# Patient Record
Sex: MALE | Race: WHITE | NOT HISPANIC OR LATINO | URBAN - METROPOLITAN AREA
[De-identification: names, ages, dates, MRNs, and addresses within clinical notes are randomized per-mention and may not be internally consistent; named-entity substitution may affect disease eponyms.]

---

## 2017-03-31 ENCOUNTER — INPATIENT (INPATIENT)
Facility: HOSPITAL | Age: 77
LOS: 3 days | Discharge: ANOTHER HOSPITAL | End: 2017-04-04
Attending: INTERNAL MEDICINE

## 2017-06-27 DIAGNOSIS — I48.92 UNSPECIFIED ATRIAL FLUTTER: ICD-10-CM

## 2017-06-27 DIAGNOSIS — K81.0 ACUTE CHOLECYSTITIS: ICD-10-CM

## 2017-06-27 DIAGNOSIS — I48.0 PAROXYSMAL ATRIAL FIBRILLATION: ICD-10-CM

## 2017-06-27 DIAGNOSIS — A41.9 SEPSIS, UNSPECIFIED ORGANISM: ICD-10-CM

## 2017-06-27 DIAGNOSIS — I25.9 CHRONIC ISCHEMIC HEART DISEASE, UNSPECIFIED: ICD-10-CM

## 2017-06-27 DIAGNOSIS — Z87.891 PERSONAL HISTORY OF NICOTINE DEPENDENCE: ICD-10-CM

## 2017-06-27 DIAGNOSIS — Z79.01 LONG TERM (CURRENT) USE OF ANTICOAGULANTS: ICD-10-CM

## 2017-06-27 DIAGNOSIS — I25.10 ATHEROSCLEROTIC HEART DISEASE OF NATIVE CORONARY ARTERY WITHOUT ANGINA PECTORIS: ICD-10-CM

## 2017-06-27 DIAGNOSIS — E78.5 HYPERLIPIDEMIA, UNSPECIFIED: ICD-10-CM

## 2017-06-27 DIAGNOSIS — I25.2 OLD MYOCARDIAL INFARCTION: ICD-10-CM

## 2022-01-01 ENCOUNTER — INPATIENT (INPATIENT)
Facility: HOSPITAL | Age: 82
LOS: 2 days | End: 2022-12-28
Attending: INTERNAL MEDICINE | Admitting: INTERNAL MEDICINE
Payer: MEDICARE

## 2022-01-01 VITALS — OXYGEN SATURATION: 100 % | RESPIRATION RATE: 18 BRPM

## 2022-01-01 VITALS
HEART RATE: 121 BPM | DIASTOLIC BLOOD PRESSURE: 67 MMHG | WEIGHT: 285.06 LBS | SYSTOLIC BLOOD PRESSURE: 108 MMHG | TEMPERATURE: 98 F | RESPIRATION RATE: 20 BRPM | OXYGEN SATURATION: 97 %

## 2022-01-01 DIAGNOSIS — I95.9 HYPOTENSION, UNSPECIFIED: ICD-10-CM

## 2022-01-01 DIAGNOSIS — Z79.01 LONG TERM (CURRENT) USE OF ANTICOAGULANTS: ICD-10-CM

## 2022-01-01 DIAGNOSIS — I48.92 UNSPECIFIED ATRIAL FLUTTER: ICD-10-CM

## 2022-01-01 DIAGNOSIS — M10.9 GOUT, UNSPECIFIED: ICD-10-CM

## 2022-01-01 DIAGNOSIS — I25.2 OLD MYOCARDIAL INFARCTION: ICD-10-CM

## 2022-01-01 DIAGNOSIS — I13.0 HYPERTENSIVE HEART AND CHRONIC KIDNEY DISEASE WITH HEART FAILURE AND STAGE 1 THROUGH STAGE 4 CHRONIC KIDNEY DISEASE, OR UNSPECIFIED CHRONIC KIDNEY DISEASE: ICD-10-CM

## 2022-01-01 DIAGNOSIS — J96.01 ACUTE RESPIRATORY FAILURE WITH HYPOXIA: ICD-10-CM

## 2022-01-01 DIAGNOSIS — I42.8 OTHER CARDIOMYOPATHIES: ICD-10-CM

## 2022-01-01 DIAGNOSIS — I47.20 VENTRICULAR TACHYCARDIA, UNSPECIFIED: ICD-10-CM

## 2022-01-01 DIAGNOSIS — A41.9 SEPSIS, UNSPECIFIED ORGANISM: ICD-10-CM

## 2022-01-01 DIAGNOSIS — J81.0 ACUTE PULMONARY EDEMA: ICD-10-CM

## 2022-01-01 DIAGNOSIS — I47.1 SUPRAVENTRICULAR TACHYCARDIA: ICD-10-CM

## 2022-01-01 DIAGNOSIS — I34.0 NONRHEUMATIC MITRAL (VALVE) INSUFFICIENCY: ICD-10-CM

## 2022-01-01 DIAGNOSIS — I50.23 ACUTE ON CHRONIC SYSTOLIC (CONGESTIVE) HEART FAILURE: ICD-10-CM

## 2022-01-01 DIAGNOSIS — I25.5 ISCHEMIC CARDIOMYOPATHY: ICD-10-CM

## 2022-01-01 DIAGNOSIS — E78.5 HYPERLIPIDEMIA, UNSPECIFIED: ICD-10-CM

## 2022-01-01 DIAGNOSIS — I48.19 OTHER PERSISTENT ATRIAL FIBRILLATION: ICD-10-CM

## 2022-01-01 DIAGNOSIS — E87.5 HYPERKALEMIA: ICD-10-CM

## 2022-01-01 DIAGNOSIS — Z95.810 PRESENCE OF AUTOMATIC (IMPLANTABLE) CARDIAC DEFIBRILLATOR: ICD-10-CM

## 2022-01-01 DIAGNOSIS — I25.10 ATHEROSCLEROTIC HEART DISEASE OF NATIVE CORONARY ARTERY WITHOUT ANGINA PECTORIS: ICD-10-CM

## 2022-01-01 DIAGNOSIS — N18.30 CHRONIC KIDNEY DISEASE, STAGE 3 UNSPECIFIED: ICD-10-CM

## 2022-01-01 DIAGNOSIS — N17.9 ACUTE KIDNEY FAILURE, UNSPECIFIED: ICD-10-CM

## 2022-01-01 DIAGNOSIS — I49.3 VENTRICULAR PREMATURE DEPOLARIZATION: ICD-10-CM

## 2022-01-01 DIAGNOSIS — R57.0 CARDIOGENIC SHOCK: ICD-10-CM

## 2022-01-01 DIAGNOSIS — R45.1 RESTLESSNESS AND AGITATION: ICD-10-CM

## 2022-01-01 DIAGNOSIS — I48.0 PAROXYSMAL ATRIAL FIBRILLATION: ICD-10-CM

## 2022-01-01 DIAGNOSIS — E87.29 OTHER ACIDOSIS: ICD-10-CM

## 2022-01-01 DIAGNOSIS — I21.4 NON-ST ELEVATION (NSTEMI) MYOCARDIAL INFARCTION: ICD-10-CM

## 2022-01-01 DIAGNOSIS — I35.0 NONRHEUMATIC AORTIC (VALVE) STENOSIS: ICD-10-CM

## 2022-01-01 DIAGNOSIS — I45.81 LONG QT SYNDROME: ICD-10-CM

## 2022-01-01 LAB
ALBUMIN SERPL ELPH-MCNC: 2.9 G/DL — LOW (ref 3.5–5.2)
ALBUMIN SERPL ELPH-MCNC: 3.1 G/DL — LOW (ref 3.5–5.2)
ALBUMIN SERPL ELPH-MCNC: 3.3 G/DL — LOW (ref 3.5–5.2)
ALBUMIN SERPL ELPH-MCNC: 3.3 G/DL — LOW (ref 3.5–5.2)
ALBUMIN SERPL ELPH-MCNC: 3.4 G/DL — LOW (ref 3.5–5.2)
ALBUMIN SERPL ELPH-MCNC: 3.7 G/DL — SIGNIFICANT CHANGE UP (ref 3.5–5.2)
ALBUMIN SERPL ELPH-MCNC: 4 G/DL — SIGNIFICANT CHANGE UP (ref 3.5–5.2)
ALP SERPL-CCNC: 30 U/L — SIGNIFICANT CHANGE UP (ref 30–115)
ALP SERPL-CCNC: 33 U/L — SIGNIFICANT CHANGE UP (ref 30–115)
ALP SERPL-CCNC: 34 U/L — SIGNIFICANT CHANGE UP (ref 30–115)
ALP SERPL-CCNC: 37 U/L — SIGNIFICANT CHANGE UP (ref 30–115)
ALP SERPL-CCNC: 39 U/L — SIGNIFICANT CHANGE UP (ref 30–115)
ALP SERPL-CCNC: 41 U/L — SIGNIFICANT CHANGE UP (ref 30–115)
ALP SERPL-CCNC: 47 U/L — SIGNIFICANT CHANGE UP (ref 30–115)
ALT FLD-CCNC: 1108 U/L — HIGH (ref 0–41)
ALT FLD-CCNC: 16 U/L — SIGNIFICANT CHANGE UP (ref 0–41)
ALT FLD-CCNC: 17 U/L — SIGNIFICANT CHANGE UP (ref 0–41)
ALT FLD-CCNC: 22 U/L — SIGNIFICANT CHANGE UP (ref 0–41)
ALT FLD-CCNC: 2384 U/L — HIGH (ref 0–41)
ANION GAP SERPL CALC-SCNC: 11 MMOL/L — SIGNIFICANT CHANGE UP (ref 7–14)
ANION GAP SERPL CALC-SCNC: 12 MMOL/L — SIGNIFICANT CHANGE UP (ref 7–14)
ANION GAP SERPL CALC-SCNC: 12 MMOL/L — SIGNIFICANT CHANGE UP (ref 7–14)
ANION GAP SERPL CALC-SCNC: 16 MMOL/L — HIGH (ref 7–14)
ANION GAP SERPL CALC-SCNC: 19 MMOL/L — HIGH (ref 7–14)
ANION GAP SERPL CALC-SCNC: 21 MMOL/L — HIGH (ref 7–14)
ANION GAP SERPL CALC-SCNC: 23 MMOL/L — HIGH (ref 7–14)
ANISOCYTOSIS BLD QL: SLIGHT — SIGNIFICANT CHANGE UP
APPEARANCE UR: CLEAR — SIGNIFICANT CHANGE UP
APTT BLD: 30 SEC — SIGNIFICANT CHANGE UP (ref 27–39.2)
APTT BLD: 31.9 SEC — SIGNIFICANT CHANGE UP (ref 27–39.2)
APTT BLD: 55.8 SEC — HIGH (ref 27–39.2)
APTT BLD: 62.1 SEC — HIGH (ref 27–39.2)
APTT BLD: 65.3 SEC — HIGH (ref 27–39.2)
APTT BLD: 66.3 SEC — HIGH (ref 27–39.2)
APTT BLD: >200 SEC — CRITICAL HIGH (ref 27–39.2)
AST SERPL-CCNC: 1327 U/L — HIGH (ref 0–41)
AST SERPL-CCNC: 29 U/L — SIGNIFICANT CHANGE UP (ref 0–41)
AST SERPL-CCNC: 36 U/L — SIGNIFICANT CHANGE UP (ref 0–41)
AST SERPL-CCNC: 3756 U/L — HIGH (ref 0–41)
AST SERPL-CCNC: 46 U/L — HIGH (ref 0–41)
AST SERPL-CCNC: 47 U/L — HIGH (ref 0–41)
AST SERPL-CCNC: 59 U/L — HIGH (ref 0–41)
BASE EXCESS BLDMV CALC-SCNC: -1.8 MMOL/L — SIGNIFICANT CHANGE UP
BASE EXCESS BLDMV CALC-SCNC: -11.3 MMOL/L — SIGNIFICANT CHANGE UP
BASE EXCESS BLDMV CALC-SCNC: -5.8 MMOL/L — SIGNIFICANT CHANGE UP
BASE EXCESS BLDMV CALC-SCNC: -8.9 MMOL/L — SIGNIFICANT CHANGE UP
BASE EXCESS BLDMV CALC-SCNC: 0.8 MMOL/L — SIGNIFICANT CHANGE UP
BASE EXCESS BLDMV CALC-SCNC: 2.5 MMOL/L — SIGNIFICANT CHANGE UP
BASE EXCESS BLDMV CALC-SCNC: 2.8 MMOL/L — SIGNIFICANT CHANGE UP
BASE EXCESS BLDMV CALC-SCNC: 3.7 MMOL/L — SIGNIFICANT CHANGE UP
BASE EXCESS BLDV CALC-SCNC: -0.8 MMOL/L — SIGNIFICANT CHANGE UP (ref -2–3)
BASOPHILS # BLD AUTO: 0 K/UL — SIGNIFICANT CHANGE UP (ref 0–0.2)
BASOPHILS # BLD AUTO: 0.02 K/UL — SIGNIFICANT CHANGE UP (ref 0–0.2)
BASOPHILS # BLD AUTO: 0.03 K/UL — SIGNIFICANT CHANGE UP (ref 0–0.2)
BASOPHILS # BLD AUTO: 0.04 K/UL — SIGNIFICANT CHANGE UP (ref 0–0.2)
BASOPHILS NFR BLD AUTO: 0 % — SIGNIFICANT CHANGE UP (ref 0–1)
BASOPHILS NFR BLD AUTO: 0.2 % — SIGNIFICANT CHANGE UP (ref 0–1)
BILIRUB SERPL-MCNC: 0.4 MG/DL — SIGNIFICANT CHANGE UP (ref 0.2–1.2)
BILIRUB SERPL-MCNC: 0.5 MG/DL — SIGNIFICANT CHANGE UP (ref 0.2–1.2)
BILIRUB SERPL-MCNC: 0.6 MG/DL — SIGNIFICANT CHANGE UP (ref 0.2–1.2)
BILIRUB SERPL-MCNC: 0.6 MG/DL — SIGNIFICANT CHANGE UP (ref 0.2–1.2)
BILIRUB SERPL-MCNC: 1 MG/DL — SIGNIFICANT CHANGE UP (ref 0.2–1.2)
BILIRUB UR-MCNC: NEGATIVE — SIGNIFICANT CHANGE UP
BLD GP AB SCN SERPL QL: SIGNIFICANT CHANGE UP
BUN SERPL-MCNC: 49 MG/DL — HIGH (ref 10–20)
BUN SERPL-MCNC: 55 MG/DL — HIGH (ref 10–20)
BUN SERPL-MCNC: 57 MG/DL — HIGH (ref 10–20)
BUN SERPL-MCNC: 58 MG/DL — HIGH (ref 10–20)
BUN SERPL-MCNC: 60 MG/DL — HIGH (ref 10–20)
BUN SERPL-MCNC: 64 MG/DL — CRITICAL HIGH (ref 10–20)
BUN SERPL-MCNC: 65 MG/DL — CRITICAL HIGH (ref 10–20)
CA-I SERPL-SCNC: 1.09 MMOL/L — LOW (ref 1.15–1.33)
CALCIUM SERPL-MCNC: 7 MG/DL — LOW (ref 8.4–10.5)
CALCIUM SERPL-MCNC: 7.4 MG/DL — LOW (ref 8.4–10.5)
CALCIUM SERPL-MCNC: 8 MG/DL — LOW (ref 8.4–10.4)
CALCIUM SERPL-MCNC: 8.2 MG/DL — LOW (ref 8.4–10.5)
CALCIUM SERPL-MCNC: 8.6 MG/DL — SIGNIFICANT CHANGE UP (ref 8.4–10.5)
CALCIUM SERPL-MCNC: 8.8 MG/DL — SIGNIFICANT CHANGE UP (ref 8.4–10.4)
CALCIUM SERPL-MCNC: 9 MG/DL — SIGNIFICANT CHANGE UP (ref 8.4–10.4)
CHLORIDE SERPL-SCNC: 100 MMOL/L — SIGNIFICANT CHANGE UP (ref 98–110)
CHLORIDE SERPL-SCNC: 92 MMOL/L — LOW (ref 98–110)
CHLORIDE SERPL-SCNC: 93 MMOL/L — LOW (ref 98–110)
CHLORIDE SERPL-SCNC: 94 MMOL/L — LOW (ref 98–110)
CHLORIDE SERPL-SCNC: 95 MMOL/L — LOW (ref 98–110)
CHLORIDE SERPL-SCNC: 96 MMOL/L — LOW (ref 98–110)
CHLORIDE SERPL-SCNC: 98 MMOL/L — SIGNIFICANT CHANGE UP (ref 98–110)
CO2 SERPL-SCNC: 17 MMOL/L — SIGNIFICANT CHANGE UP (ref 17–32)
CO2 SERPL-SCNC: 18 MMOL/L — SIGNIFICANT CHANGE UP (ref 17–32)
CO2 SERPL-SCNC: 22 MMOL/L — SIGNIFICANT CHANGE UP (ref 17–32)
CO2 SERPL-SCNC: 23 MMOL/L — SIGNIFICANT CHANGE UP (ref 17–32)
CO2 SERPL-SCNC: 24 MMOL/L — SIGNIFICANT CHANGE UP (ref 17–32)
CO2 SERPL-SCNC: 24 MMOL/L — SIGNIFICANT CHANGE UP (ref 17–32)
CO2 SERPL-SCNC: 25 MMOL/L — SIGNIFICANT CHANGE UP (ref 17–32)
COLOR SPEC: SIGNIFICANT CHANGE UP
CREAT SERPL-MCNC: 2.2 MG/DL — HIGH (ref 0.7–1.5)
CREAT SERPL-MCNC: 2.3 MG/DL — HIGH (ref 0.7–1.5)
CREAT SERPL-MCNC: 2.5 MG/DL — HIGH (ref 0.7–1.5)
CREAT SERPL-MCNC: 2.5 MG/DL — HIGH (ref 0.7–1.5)
CREAT SERPL-MCNC: 2.6 MG/DL — HIGH (ref 0.7–1.5)
CREAT SERPL-MCNC: 2.7 MG/DL — HIGH (ref 0.7–1.5)
CREAT SERPL-MCNC: 3.3 MG/DL — HIGH (ref 0.7–1.5)
CULTURE RESULTS: NO GROWTH — SIGNIFICANT CHANGE UP
DIFF PNL FLD: NEGATIVE — SIGNIFICANT CHANGE UP
EGFR: 18 ML/MIN/1.73M2 — LOW
EGFR: 23 ML/MIN/1.73M2 — LOW
EGFR: 24 ML/MIN/1.73M2 — LOW
EGFR: 25 ML/MIN/1.73M2 — LOW
EGFR: 25 ML/MIN/1.73M2 — LOW
EGFR: 28 ML/MIN/1.73M2 — LOW
EGFR: 29 ML/MIN/1.73M2 — LOW
EOSINOPHIL # BLD AUTO: 0 K/UL — SIGNIFICANT CHANGE UP (ref 0–0.7)
EOSINOPHIL # BLD AUTO: 0.01 K/UL — SIGNIFICANT CHANGE UP (ref 0–0.7)
EOSINOPHIL # BLD AUTO: 0.01 K/UL — SIGNIFICANT CHANGE UP (ref 0–0.7)
EOSINOPHIL # BLD AUTO: 0.04 K/UL — SIGNIFICANT CHANGE UP (ref 0–0.7)
EOSINOPHIL NFR BLD AUTO: 0 % — SIGNIFICANT CHANGE UP (ref 0–8)
EOSINOPHIL NFR BLD AUTO: 0.1 % — SIGNIFICANT CHANGE UP (ref 0–8)
EOSINOPHIL NFR BLD AUTO: 0.1 % — SIGNIFICANT CHANGE UP (ref 0–8)
EOSINOPHIL NFR BLD AUTO: 0.2 % — SIGNIFICANT CHANGE UP (ref 0–8)
FERRITIN SERPL-MCNC: 756 NG/ML — HIGH (ref 30–400)
GAS PNL BLDA: SIGNIFICANT CHANGE UP
GAS PNL BLDMV: SIGNIFICANT CHANGE UP
GAS PNL BLDV: 131 MMOL/L — LOW (ref 136–145)
GAS PNL BLDV: SIGNIFICANT CHANGE UP
GAS PNL BLDV: SIGNIFICANT CHANGE UP
GIANT PLATELETS BLD QL SMEAR: PRESENT — SIGNIFICANT CHANGE UP
GLUCOSE BLDC GLUCOMTR-MCNC: 173 MG/DL — HIGH (ref 70–99)
GLUCOSE BLDC GLUCOMTR-MCNC: 207 MG/DL — HIGH (ref 70–99)
GLUCOSE BLDC GLUCOMTR-MCNC: 225 MG/DL — HIGH (ref 70–99)
GLUCOSE BLDC GLUCOMTR-MCNC: 244 MG/DL — HIGH (ref 70–99)
GLUCOSE BLDC GLUCOMTR-MCNC: 293 MG/DL — HIGH (ref 70–99)
GLUCOSE SERPL-MCNC: 149 MG/DL — HIGH (ref 70–99)
GLUCOSE SERPL-MCNC: 150 MG/DL — HIGH (ref 70–99)
GLUCOSE SERPL-MCNC: 175 MG/DL — HIGH (ref 70–99)
GLUCOSE SERPL-MCNC: 222 MG/DL — HIGH (ref 70–99)
GLUCOSE SERPL-MCNC: 222 MG/DL — HIGH (ref 70–99)
GLUCOSE SERPL-MCNC: 253 MG/DL — HIGH (ref 70–99)
GLUCOSE SERPL-MCNC: 269 MG/DL — HIGH (ref 70–99)
GLUCOSE UR QL: NEGATIVE — SIGNIFICANT CHANGE UP
HCO3 BLDMV-SCNC: 19 MMOL/L — SIGNIFICANT CHANGE UP
HCO3 BLDMV-SCNC: 20 MMOL/L — SIGNIFICANT CHANGE UP
HCO3 BLDMV-SCNC: 24 MMOL/L — SIGNIFICANT CHANGE UP
HCO3 BLDMV-SCNC: 26 MMOL/L — SIGNIFICANT CHANGE UP
HCO3 BLDMV-SCNC: 26 MMOL/L — SIGNIFICANT CHANGE UP
HCO3 BLDMV-SCNC: 28 MMOL/L — SIGNIFICANT CHANGE UP
HCO3 BLDV-SCNC: 24 MMOL/L — SIGNIFICANT CHANGE UP (ref 22–29)
HCT VFR BLD CALC: 27.6 % — LOW (ref 42–52)
HCT VFR BLD CALC: 28.3 % — LOW (ref 42–52)
HCT VFR BLD CALC: 29.1 % — LOW (ref 42–52)
HCT VFR BLD CALC: 29.3 % — LOW (ref 42–52)
HCT VFR BLD CALC: 30.2 % — LOW (ref 42–52)
HCT VFR BLD CALC: 31.7 % — LOW (ref 42–52)
HCT VFR BLD CALC: 33 % — LOW (ref 42–52)
HCT VFR BLDA CALC: 35 % — LOW (ref 39–51)
HGB BLD CALC-MCNC: 11.5 G/DL — LOW (ref 12.6–17.4)
HGB BLD-MCNC: 8.1 G/DL — LOW (ref 14–18)
HGB BLD-MCNC: 8.6 G/DL — LOW (ref 14–18)
HGB BLD-MCNC: 8.8 G/DL — LOW (ref 14–18)
HGB BLD-MCNC: 8.9 G/DL — LOW (ref 14–18)
HGB BLD-MCNC: 8.9 G/DL — LOW (ref 14–18)
HGB BLD-MCNC: 9 G/DL — LOW (ref 14–18)
HGB BLD-MCNC: 9.6 G/DL — LOW (ref 14–18)
HOROWITZ INDEX BLDMV+IHG-RTO: 100 — SIGNIFICANT CHANGE UP
HOROWITZ INDEX BLDMV+IHG-RTO: 28 — SIGNIFICANT CHANGE UP
HOROWITZ INDEX BLDMV+IHG-RTO: 36 — SIGNIFICANT CHANGE UP
IMM GRANULOCYTES NFR BLD AUTO: 1.5 % — HIGH (ref 0.1–0.3)
IMM GRANULOCYTES NFR BLD AUTO: 1.8 % — HIGH (ref 0.1–0.3)
IMM GRANULOCYTES NFR BLD AUTO: 3.5 % — HIGH (ref 0.1–0.3)
INR BLD: 1.54 RATIO — HIGH (ref 0.65–1.3)
INR BLD: 1.99 RATIO — HIGH (ref 0.65–1.3)
IRON SATN MFR SERPL: 18 UG/DL — LOW (ref 35–150)
IRON SATN MFR SERPL: 8 % — LOW (ref 15–50)
KETONES UR-MCNC: NEGATIVE — SIGNIFICANT CHANGE UP
LACTATE BLDV-MCNC: 3.7 MMOL/L — HIGH (ref 0.5–2)
LACTATE SERPL-SCNC: 1.2 MMOL/L — SIGNIFICANT CHANGE UP (ref 0.7–2)
LACTATE SERPL-SCNC: 7.1 MMOL/L — CRITICAL HIGH (ref 0.7–2)
LEUKOCYTE ESTERASE UR-ACNC: NEGATIVE — SIGNIFICANT CHANGE UP
LYMPHOCYTES # BLD AUTO: 0.8 K/UL — LOW (ref 1.2–3.4)
LYMPHOCYTES # BLD AUTO: 0.89 K/UL — LOW (ref 1.2–3.4)
LYMPHOCYTES # BLD AUTO: 0.98 K/UL — LOW (ref 1.2–3.4)
LYMPHOCYTES # BLD AUTO: 1.06 K/UL — LOW (ref 1.2–3.4)
LYMPHOCYTES # BLD AUTO: 4.4 % — LOW (ref 20.5–51.1)
LYMPHOCYTES # BLD AUTO: 5.8 % — LOW (ref 20.5–51.1)
LYMPHOCYTES # BLD AUTO: 5.9 % — LOW (ref 20.5–51.1)
LYMPHOCYTES # BLD AUTO: 7 % — LOW (ref 20.5–51.1)
MAGNESIUM SERPL-MCNC: 2.2 MG/DL — SIGNIFICANT CHANGE UP (ref 1.8–2.4)
MAGNESIUM SERPL-MCNC: 2.4 MG/DL — SIGNIFICANT CHANGE UP (ref 1.8–2.4)
MAGNESIUM SERPL-MCNC: 2.4 MG/DL — SIGNIFICANT CHANGE UP (ref 1.8–2.4)
MAGNESIUM SERPL-MCNC: 2.5 MG/DL — HIGH (ref 1.8–2.4)
MAGNESIUM SERPL-MCNC: 2.5 MG/DL — HIGH (ref 1.8–2.4)
MAGNESIUM SERPL-MCNC: 2.6 MG/DL — HIGH (ref 1.8–2.4)
MANUAL SMEAR VERIFICATION: SIGNIFICANT CHANGE UP
MCHC RBC-ENTMCNC: 26.3 PG — LOW (ref 27–31)
MCHC RBC-ENTMCNC: 26.3 PG — LOW (ref 27–31)
MCHC RBC-ENTMCNC: 26.5 PG — LOW (ref 27–31)
MCHC RBC-ENTMCNC: 26.5 PG — LOW (ref 27–31)
MCHC RBC-ENTMCNC: 26.6 PG — LOW (ref 27–31)
MCHC RBC-ENTMCNC: 26.7 PG — LOW (ref 27–31)
MCHC RBC-ENTMCNC: 27 PG — SIGNIFICANT CHANGE UP (ref 27–31)
MCHC RBC-ENTMCNC: 28.4 G/DL — LOW (ref 32–37)
MCHC RBC-ENTMCNC: 29.1 G/DL — LOW (ref 32–37)
MCHC RBC-ENTMCNC: 29.1 G/DL — LOW (ref 32–37)
MCHC RBC-ENTMCNC: 29.3 G/DL — LOW (ref 32–37)
MCHC RBC-ENTMCNC: 30.4 G/DL — LOW (ref 32–37)
MCHC RBC-ENTMCNC: 30.4 G/DL — LOW (ref 32–37)
MCHC RBC-ENTMCNC: 30.6 G/DL — LOW (ref 32–37)
MCV RBC AUTO: 87.4 FL — SIGNIFICANT CHANGE UP (ref 80–94)
MCV RBC AUTO: 87.7 FL — SIGNIFICANT CHANGE UP (ref 80–94)
MCV RBC AUTO: 88.7 FL — SIGNIFICANT CHANGE UP (ref 80–94)
MCV RBC AUTO: 89.6 FL — SIGNIFICANT CHANGE UP (ref 80–94)
MCV RBC AUTO: 90.4 FL — SIGNIFICANT CHANGE UP (ref 80–94)
MCV RBC AUTO: 91 FL — SIGNIFICANT CHANGE UP (ref 80–94)
MCV RBC AUTO: 93.2 FL — SIGNIFICANT CHANGE UP (ref 80–94)
MONOCYTES # BLD AUTO: 0.58 K/UL — SIGNIFICANT CHANGE UP (ref 0.1–0.6)
MONOCYTES # BLD AUTO: 0.8 K/UL — HIGH (ref 0.1–0.6)
MONOCYTES # BLD AUTO: 1 K/UL — HIGH (ref 0.1–0.6)
MONOCYTES # BLD AUTO: 1 K/UL — HIGH (ref 0.1–0.6)
MONOCYTES NFR BLD AUTO: 2.6 % — SIGNIFICANT CHANGE UP (ref 1.7–9.3)
MONOCYTES NFR BLD AUTO: 5.3 % — SIGNIFICANT CHANGE UP (ref 1.7–9.3)
MONOCYTES NFR BLD AUTO: 5.5 % — SIGNIFICANT CHANGE UP (ref 1.7–9.3)
MONOCYTES NFR BLD AUTO: 8.8 % — SIGNIFICANT CHANGE UP (ref 1.7–9.3)
MRSA PCR RESULT.: NEGATIVE — SIGNIFICANT CHANGE UP
NEUTROPHILS # BLD AUTO: 12.72 K/UL — HIGH (ref 1.4–6.5)
NEUTROPHILS # BLD AUTO: 15.82 K/UL — HIGH (ref 1.4–6.5)
NEUTROPHILS # BLD AUTO: 20.76 K/UL — HIGH (ref 1.4–6.5)
NEUTROPHILS # BLD AUTO: 9.32 K/UL — HIGH (ref 1.4–6.5)
NEUTROPHILS NFR BLD AUTO: 82.1 % — HIGH (ref 42.2–75.2)
NEUTROPHILS NFR BLD AUTO: 85 % — HIGH (ref 42.2–75.2)
NEUTROPHILS NFR BLD AUTO: 86.8 % — HIGH (ref 42.2–75.2)
NEUTROPHILS NFR BLD AUTO: 93 % — HIGH (ref 42.2–75.2)
NITRITE UR-MCNC: NEGATIVE — SIGNIFICANT CHANGE UP
NRBC # BLD: 0 /100 WBCS — SIGNIFICANT CHANGE UP (ref 0–0)
NRBC # BLD: 2 /100 WBCS — HIGH (ref 0–0)
NT-PROBNP SERPL-SCNC: HIGH PG/ML (ref 0–300)
O2 CT VFR BLD CALC: 29 MMHG — SIGNIFICANT CHANGE UP
O2 CT VFR BLD CALC: 30 MMHG — SIGNIFICANT CHANGE UP
O2 CT VFR BLD CALC: 33 MMHG — SIGNIFICANT CHANGE UP
O2 CT VFR BLD CALC: 35 MMHG — SIGNIFICANT CHANGE UP
O2 CT VFR BLD CALC: 37 MMHG — SIGNIFICANT CHANGE UP
O2 CT VFR BLD CALC: 38 MMHG — SIGNIFICANT CHANGE UP
O2 CT VFR BLD CALC: 38 MMHG — SIGNIFICANT CHANGE UP
O2 CT VFR BLD CALC: 53 MMHG — SIGNIFICANT CHANGE UP
OVALOCYTES BLD QL SMEAR: SLIGHT — SIGNIFICANT CHANGE UP
PCO2 BLDMV: 43 MMHG — SIGNIFICANT CHANGE UP
PCO2 BLDMV: 43 MMHG — SIGNIFICANT CHANGE UP
PCO2 BLDMV: 45 MMHG — SIGNIFICANT CHANGE UP
PCO2 BLDMV: 47 MMHG — SIGNIFICANT CHANGE UP
PCO2 BLDMV: 55 MMHG — SIGNIFICANT CHANGE UP
PCO2 BLDMV: 57 MMHG — SIGNIFICANT CHANGE UP
PCO2 BLDMV: 59 MMHG — SIGNIFICANT CHANGE UP
PCO2 BLDMV: 70 MMHG — SIGNIFICANT CHANGE UP
PCO2 BLDV: 41 MMHG — LOW (ref 42–55)
PH BLDMV: 7.11 — SIGNIFICANT CHANGE UP
PH BLDMV: 7.15 — SIGNIFICANT CHANGE UP
PH BLDMV: 7.17 — SIGNIFICANT CHANGE UP
PH BLDMV: 7.26 — SIGNIFICANT CHANGE UP
PH BLDMV: 7.39 — SIGNIFICANT CHANGE UP
PH BLDMV: 7.39 — SIGNIFICANT CHANGE UP
PH BLDMV: 7.4 — SIGNIFICANT CHANGE UP
PH BLDMV: 7.43 — SIGNIFICANT CHANGE UP
PH BLDV: 7.38 — SIGNIFICANT CHANGE UP (ref 7.32–7.43)
PH UR: 5.5 — SIGNIFICANT CHANGE UP (ref 5–8)
PLAT MORPH BLD: NORMAL — SIGNIFICANT CHANGE UP
PLATELET # BLD AUTO: 173 K/UL — SIGNIFICANT CHANGE UP (ref 130–400)
PLATELET # BLD AUTO: 243 K/UL — SIGNIFICANT CHANGE UP (ref 130–400)
PLATELET # BLD AUTO: 258 K/UL — SIGNIFICANT CHANGE UP (ref 130–400)
PLATELET # BLD AUTO: 260 K/UL — SIGNIFICANT CHANGE UP (ref 130–400)
PLATELET # BLD AUTO: 264 K/UL — SIGNIFICANT CHANGE UP (ref 130–400)
PLATELET # BLD AUTO: 267 K/UL — SIGNIFICANT CHANGE UP (ref 130–400)
PLATELET # BLD AUTO: 324 K/UL — SIGNIFICANT CHANGE UP (ref 130–400)
PO2 BLDV: 25 MMHG — SIGNIFICANT CHANGE UP
POLYCHROMASIA BLD QL SMEAR: SLIGHT — SIGNIFICANT CHANGE UP
POTASSIUM BLDV-SCNC: 6.3 MMOL/L — CRITICAL HIGH (ref 3.5–5.1)
POTASSIUM SERPL-MCNC: 3.5 MMOL/L — SIGNIFICANT CHANGE UP (ref 3.5–5)
POTASSIUM SERPL-MCNC: 4 MMOL/L — SIGNIFICANT CHANGE UP (ref 3.5–5)
POTASSIUM SERPL-MCNC: 4.8 MMOL/L — SIGNIFICANT CHANGE UP (ref 3.5–5)
POTASSIUM SERPL-MCNC: 4.8 MMOL/L — SIGNIFICANT CHANGE UP (ref 3.5–5)
POTASSIUM SERPL-MCNC: 5 MMOL/L — SIGNIFICANT CHANGE UP (ref 3.5–5)
POTASSIUM SERPL-MCNC: 5.2 MMOL/L — HIGH (ref 3.5–5)
POTASSIUM SERPL-MCNC: 5.7 MMOL/L — HIGH (ref 3.5–5)
POTASSIUM SERPL-SCNC: 3.5 MMOL/L — SIGNIFICANT CHANGE UP (ref 3.5–5)
POTASSIUM SERPL-SCNC: 4 MMOL/L — SIGNIFICANT CHANGE UP (ref 3.5–5)
POTASSIUM SERPL-SCNC: 4.8 MMOL/L — SIGNIFICANT CHANGE UP (ref 3.5–5)
POTASSIUM SERPL-SCNC: 4.8 MMOL/L — SIGNIFICANT CHANGE UP (ref 3.5–5)
POTASSIUM SERPL-SCNC: 5 MMOL/L — SIGNIFICANT CHANGE UP (ref 3.5–5)
POTASSIUM SERPL-SCNC: 5.2 MMOL/L — HIGH (ref 3.5–5)
POTASSIUM SERPL-SCNC: 5.7 MMOL/L — HIGH (ref 3.5–5)
PROCALCITONIN SERPL-MCNC: 0.92 NG/ML — HIGH (ref 0.02–0.1)
PROT SERPL-MCNC: 4.8 G/DL — LOW (ref 6–8)
PROT SERPL-MCNC: 5.4 G/DL — LOW (ref 6–8)
PROT SERPL-MCNC: 5.6 G/DL — LOW (ref 6–8)
PROT SERPL-MCNC: 5.8 G/DL — LOW (ref 6–8)
PROT SERPL-MCNC: 5.9 G/DL — LOW (ref 6–8)
PROT SERPL-MCNC: 6.2 G/DL — SIGNIFICANT CHANGE UP (ref 6–8)
PROT SERPL-MCNC: 6.8 G/DL — SIGNIFICANT CHANGE UP (ref 6–8)
PROT UR-MCNC: NEGATIVE — SIGNIFICANT CHANGE UP
PROTHROM AB SERPL-ACNC: 17.8 SEC — HIGH (ref 9.95–12.87)
PROTHROM AB SERPL-ACNC: 23.2 SEC — HIGH (ref 9.95–12.87)
RAPID RVP RESULT: SIGNIFICANT CHANGE UP
RBC # BLD: 3.08 M/UL — LOW (ref 4.7–6.1)
RBC # BLD: 3.19 M/UL — LOW (ref 4.7–6.1)
RBC # BLD: 3.32 M/UL — LOW (ref 4.7–6.1)
RBC # BLD: 3.33 M/UL — LOW (ref 4.7–6.1)
RBC # BLD: 3.34 M/UL — LOW (ref 4.7–6.1)
RBC # BLD: 3.4 M/UL — LOW (ref 4.7–6.1)
RBC # BLD: 3.65 M/UL — LOW (ref 4.7–6.1)
RBC # FLD: 18.7 % — HIGH (ref 11.5–14.5)
RBC # FLD: 18.8 % — HIGH (ref 11.5–14.5)
RBC # FLD: 18.9 % — HIGH (ref 11.5–14.5)
RBC # FLD: 18.9 % — HIGH (ref 11.5–14.5)
RBC # FLD: 19 % — HIGH (ref 11.5–14.5)
RBC # FLD: 19 % — HIGH (ref 11.5–14.5)
RBC # FLD: 19.4 % — HIGH (ref 11.5–14.5)
RBC BLD AUTO: ABNORMAL
SAO2 % BLDMV: 37.9 % — SIGNIFICANT CHANGE UP
SAO2 % BLDMV: 45.3 % — SIGNIFICANT CHANGE UP
SAO2 % BLDMV: 46.9 % — SIGNIFICANT CHANGE UP
SAO2 % BLDMV: 50.3 % — SIGNIFICANT CHANGE UP
SAO2 % BLDMV: 50.8 % — SIGNIFICANT CHANGE UP
SAO2 % BLDMV: 51.5 % — SIGNIFICANT CHANGE UP
SAO2 % BLDMV: 59.1 % — SIGNIFICANT CHANGE UP
SAO2 % BLDMV: 79 % — SIGNIFICANT CHANGE UP
SAO2 % BLDV: 32.6 % — SIGNIFICANT CHANGE UP
SARS-COV-2 RNA SPEC QL NAA+PROBE: SIGNIFICANT CHANGE UP
SARS-COV-2 RNA SPEC QL NAA+PROBE: SIGNIFICANT CHANGE UP
SODIUM SERPL-SCNC: 129 MMOL/L — LOW (ref 135–146)
SODIUM SERPL-SCNC: 130 MMOL/L — LOW (ref 135–146)
SODIUM SERPL-SCNC: 131 MMOL/L — LOW (ref 135–146)
SODIUM SERPL-SCNC: 131 MMOL/L — LOW (ref 135–146)
SODIUM SERPL-SCNC: 137 MMOL/L — SIGNIFICANT CHANGE UP (ref 135–146)
SODIUM SERPL-SCNC: 137 MMOL/L — SIGNIFICANT CHANGE UP (ref 135–146)
SODIUM SERPL-SCNC: 140 MMOL/L — SIGNIFICANT CHANGE UP (ref 135–146)
SP GR SPEC: 1.01 — SIGNIFICANT CHANGE UP (ref 1.01–1.03)
SPECIMEN SOURCE: SIGNIFICANT CHANGE UP
TIBC SERPL-MCNC: 229 UG/DL — SIGNIFICANT CHANGE UP (ref 220–430)
TROPONIN T SERPL-MCNC: 0.15 NG/ML — CRITICAL HIGH
TROPONIN T SERPL-MCNC: 0.91 NG/ML — CRITICAL HIGH
TROPONIN T SERPL-MCNC: 0.99 NG/ML — CRITICAL HIGH
UIBC SERPL-MCNC: 211 UG/DL — SIGNIFICANT CHANGE UP (ref 110–370)
UROBILINOGEN FLD QL: SIGNIFICANT CHANGE UP
WBC # BLD: 11.35 K/UL — HIGH (ref 4.8–10.8)
WBC # BLD: 11.87 K/UL — HIGH (ref 4.8–10.8)
WBC # BLD: 13.71 K/UL — HIGH (ref 4.8–10.8)
WBC # BLD: 14.97 K/UL — HIGH (ref 4.8–10.8)
WBC # BLD: 16.79 K/UL — HIGH (ref 4.8–10.8)
WBC # BLD: 18.24 K/UL — HIGH (ref 4.8–10.8)
WBC # BLD: 22.32 K/UL — HIGH (ref 4.8–10.8)
WBC # FLD AUTO: 11.35 K/UL — HIGH (ref 4.8–10.8)
WBC # FLD AUTO: 11.87 K/UL — HIGH (ref 4.8–10.8)
WBC # FLD AUTO: 13.71 K/UL — HIGH (ref 4.8–10.8)
WBC # FLD AUTO: 14.97 K/UL — HIGH (ref 4.8–10.8)
WBC # FLD AUTO: 16.79 K/UL — HIGH (ref 4.8–10.8)
WBC # FLD AUTO: 18.24 K/UL — HIGH (ref 4.8–10.8)
WBC # FLD AUTO: 22.32 K/UL — HIGH (ref 4.8–10.8)

## 2022-01-01 PROCEDURE — 99291 CRITICAL CARE FIRST HOUR: CPT | Mod: 25,GC

## 2022-01-01 PROCEDURE — 93308 TTE F-UP OR LMTD: CPT | Mod: 26,GC

## 2022-01-01 PROCEDURE — 71045 X-RAY EXAM CHEST 1 VIEW: CPT | Mod: 26,76

## 2022-01-01 PROCEDURE — 93282 PRGRMG EVAL IMPLANTABLE DFB: CPT | Mod: 26

## 2022-01-01 PROCEDURE — 71045 X-RAY EXAM CHEST 1 VIEW: CPT | Mod: 26

## 2022-01-01 PROCEDURE — 99291 CRITICAL CARE FIRST HOUR: CPT

## 2022-01-01 PROCEDURE — 99223 1ST HOSP IP/OBS HIGH 75: CPT

## 2022-01-01 PROCEDURE — 93010 ELECTROCARDIOGRAM REPORT: CPT

## 2022-01-01 PROCEDURE — 93306 TTE W/DOPPLER COMPLETE: CPT | Mod: 26

## 2022-01-01 PROCEDURE — 93289 INTERROG DEVICE EVAL HEART: CPT | Mod: 26,59

## 2022-01-01 PROCEDURE — 92986 REVISION OF AORTIC VALVE: CPT

## 2022-01-01 PROCEDURE — 75574 CT ANGIO HRT W/3D IMAGE: CPT | Mod: 26

## 2022-01-01 PROCEDURE — 73080 X-RAY EXAM OF ELBOW: CPT | Mod: 26,RT

## 2022-01-01 PROCEDURE — 71045 X-RAY EXAM CHEST 1 VIEW: CPT | Mod: 26,77

## 2022-01-01 RX ORDER — AMIODARONE HYDROCHLORIDE 400 MG/1
0.5 TABLET ORAL
Qty: 900 | Refills: 0 | Status: DISCONTINUED | OUTPATIENT
Start: 2022-01-01 | End: 2022-01-01

## 2022-01-01 RX ORDER — LIDOCAINE HCL 20 MG/ML
100 VIAL (ML) INJECTION ONCE
Refills: 0 | Status: COMPLETED | OUTPATIENT
Start: 2022-01-01 | End: 2022-01-01

## 2022-01-01 RX ORDER — DIGOXIN 250 MCG
450 TABLET ORAL ONCE
Refills: 0 | Status: COMPLETED | OUTPATIENT
Start: 2022-01-01 | End: 2022-01-01

## 2022-01-01 RX ORDER — SODIUM CHLORIDE 9 MG/ML
500 INJECTION INTRAMUSCULAR; INTRAVENOUS; SUBCUTANEOUS ONCE
Refills: 0 | Status: COMPLETED | OUTPATIENT
Start: 2022-01-01 | End: 2022-01-01

## 2022-01-01 RX ORDER — PHENYLEPHRINE HYDROCHLORIDE 10 MG/ML
0.3 INJECTION INTRAVENOUS
Qty: 160 | Refills: 0 | Status: DISCONTINUED | OUTPATIENT
Start: 2022-01-01 | End: 2022-01-01

## 2022-01-01 RX ORDER — HEPARIN SODIUM 5000 [USP'U]/ML
10000 INJECTION INTRAVENOUS; SUBCUTANEOUS EVERY 6 HOURS
Refills: 0 | Status: DISCONTINUED | OUTPATIENT
Start: 2022-01-01 | End: 2022-01-01

## 2022-01-01 RX ORDER — BUMETANIDE 0.25 MG/ML
2 INJECTION INTRAMUSCULAR; INTRAVENOUS ONCE
Refills: 0 | Status: COMPLETED | OUTPATIENT
Start: 2022-01-01 | End: 2022-01-01

## 2022-01-01 RX ORDER — HEPARIN SODIUM 5000 [USP'U]/ML
INJECTION INTRAVENOUS; SUBCUTANEOUS
Qty: 25000 | Refills: 0 | Status: DISCONTINUED | OUTPATIENT
Start: 2022-01-01 | End: 2022-01-01

## 2022-01-01 RX ORDER — FENTANYL CITRATE 50 UG/ML
0.5 INJECTION INTRAVENOUS
Qty: 2500 | Refills: 0 | Status: DISCONTINUED | OUTPATIENT
Start: 2022-01-01 | End: 2022-01-01

## 2022-01-01 RX ORDER — DOBUTAMINE HCL 250MG/20ML
5 VIAL (ML) INTRAVENOUS
Qty: 500 | Refills: 0 | Status: DISCONTINUED | OUTPATIENT
Start: 2022-01-01 | End: 2022-01-01

## 2022-01-01 RX ORDER — CEFAZOLIN SODIUM 1 G
1000 VIAL (EA) INJECTION EVERY 8 HOURS
Refills: 0 | Status: DISCONTINUED | OUTPATIENT
Start: 2022-01-01 | End: 2022-01-01

## 2022-01-01 RX ORDER — BUMETANIDE 0.25 MG/ML
2 INJECTION INTRAMUSCULAR; INTRAVENOUS
Qty: 20 | Refills: 0 | Status: DISCONTINUED | OUTPATIENT
Start: 2022-01-01 | End: 2022-01-01

## 2022-01-01 RX ORDER — HEPARIN SODIUM 5000 [USP'U]/ML
10000 INJECTION INTRAVENOUS; SUBCUTANEOUS ONCE
Refills: 0 | Status: COMPLETED | OUTPATIENT
Start: 2022-01-01 | End: 2022-01-01

## 2022-01-01 RX ORDER — METOPROLOL TARTRATE 50 MG
2.5 TABLET ORAL ONCE
Refills: 0 | Status: COMPLETED | OUTPATIENT
Start: 2022-01-01 | End: 2022-01-01

## 2022-01-01 RX ORDER — CEFEPIME 1 G/1
INJECTION, POWDER, FOR SOLUTION INTRAMUSCULAR; INTRAVENOUS
Refills: 0 | Status: DISCONTINUED | OUTPATIENT
Start: 2022-01-01 | End: 2022-01-01

## 2022-01-01 RX ORDER — MIDAZOLAM HYDROCHLORIDE 1 MG/ML
0.02 INJECTION, SOLUTION INTRAMUSCULAR; INTRAVENOUS
Qty: 100 | Refills: 0 | Status: DISCONTINUED | OUTPATIENT
Start: 2022-01-01 | End: 2022-01-01

## 2022-01-01 RX ORDER — POTASSIUM CHLORIDE 20 MEQ
20 PACKET (EA) ORAL
Refills: 0 | Status: COMPLETED | OUTPATIENT
Start: 2022-01-01 | End: 2022-01-01

## 2022-01-01 RX ORDER — METOPROLOL TARTRATE 50 MG
25 TABLET ORAL
Refills: 0 | Status: DISCONTINUED | OUTPATIENT
Start: 2022-01-01 | End: 2022-01-01

## 2022-01-01 RX ORDER — VANCOMYCIN HCL 1 G
1500 VIAL (EA) INTRAVENOUS ONCE
Refills: 0 | Status: COMPLETED | OUTPATIENT
Start: 2022-01-01 | End: 2022-01-01

## 2022-01-01 RX ORDER — VANCOMYCIN HCL 1 G
1500 VIAL (EA) INTRAVENOUS EVERY 8 HOURS
Refills: 0 | Status: DISCONTINUED | OUTPATIENT
Start: 2022-01-01 | End: 2022-01-01

## 2022-01-01 RX ORDER — VANCOMYCIN HCL 1 G
1000 VIAL (EA) INTRAVENOUS EVERY 24 HOURS
Refills: 0 | Status: DISCONTINUED | OUTPATIENT
Start: 2022-01-01 | End: 2022-01-01

## 2022-01-01 RX ORDER — CHLORHEXIDINE GLUCONATE 213 G/1000ML
15 SOLUTION TOPICAL EVERY 12 HOURS
Refills: 0 | Status: DISCONTINUED | OUTPATIENT
Start: 2022-01-01 | End: 2022-01-01

## 2022-01-01 RX ORDER — TAMSULOSIN HYDROCHLORIDE 0.4 MG/1
0.4 CAPSULE ORAL AT BEDTIME
Refills: 0 | Status: DISCONTINUED | OUTPATIENT
Start: 2022-01-01 | End: 2022-01-01

## 2022-01-01 RX ORDER — ALLOPURINOL 300 MG
1 TABLET ORAL
Qty: 0 | Refills: 0 | DISCHARGE

## 2022-01-01 RX ORDER — FENTANYL CITRATE 50 UG/ML
100 INJECTION INTRAVENOUS ONCE
Refills: 0 | Status: DISCONTINUED | OUTPATIENT
Start: 2022-01-01 | End: 2022-01-01

## 2022-01-01 RX ORDER — CEFEPIME 1 G/1
2000 INJECTION, POWDER, FOR SOLUTION INTRAMUSCULAR; INTRAVENOUS EVERY 12 HOURS
Refills: 0 | Status: DISCONTINUED | OUTPATIENT
Start: 2022-01-01 | End: 2022-01-01

## 2022-01-01 RX ORDER — AMIODARONE HYDROCHLORIDE 400 MG/1
1 TABLET ORAL
Qty: 900 | Refills: 0 | Status: DISCONTINUED | OUTPATIENT
Start: 2022-01-01 | End: 2022-01-01

## 2022-01-01 RX ORDER — NOREPINEPHRINE BITARTRATE/D5W 8 MG/250ML
0.05 PLASTIC BAG, INJECTION (ML) INTRAVENOUS
Qty: 8 | Refills: 0 | Status: DISCONTINUED | OUTPATIENT
Start: 2022-01-01 | End: 2022-01-01

## 2022-01-01 RX ORDER — CEFEPIME 1 G/1
2000 INJECTION, POWDER, FOR SOLUTION INTRAMUSCULAR; INTRAVENOUS ONCE
Refills: 0 | Status: COMPLETED | OUTPATIENT
Start: 2022-01-01 | End: 2022-01-01

## 2022-01-01 RX ORDER — SODIUM BICARBONATE 1 MEQ/ML
100 SYRINGE (ML) INTRAVENOUS ONCE
Refills: 0 | Status: COMPLETED | OUTPATIENT
Start: 2022-01-01 | End: 2022-01-01

## 2022-01-01 RX ORDER — DIGOXIN 250 MCG
250 TABLET ORAL EVERY 8 HOURS
Refills: 0 | Status: DISCONTINUED | OUTPATIENT
Start: 2022-01-01 | End: 2022-01-01

## 2022-01-01 RX ORDER — BUMETANIDE 0.25 MG/ML
1 INJECTION INTRAMUSCULAR; INTRAVENOUS ONCE
Refills: 0 | Status: COMPLETED | OUTPATIENT
Start: 2022-01-01 | End: 2022-01-01

## 2022-01-01 RX ORDER — PHENYLEPHRINE HYDROCHLORIDE 10 MG/ML
0.1 INJECTION INTRAVENOUS
Qty: 40 | Refills: 0 | Status: DISCONTINUED | OUTPATIENT
Start: 2022-01-01 | End: 2022-01-01

## 2022-01-01 RX ORDER — FENTANYL CITRATE 50 UG/ML
0.5 INJECTION INTRAVENOUS
Qty: 5000 | Refills: 0 | Status: DISCONTINUED | OUTPATIENT
Start: 2022-01-01 | End: 2022-01-01

## 2022-01-01 RX ORDER — PROCAINAMIDE HCL 500 MG
1000 TABLET, EXTENDED RELEASE ORAL ONCE
Refills: 0 | Status: DISCONTINUED | OUTPATIENT
Start: 2022-01-01 | End: 2022-01-01

## 2022-01-01 RX ORDER — METOPROLOL TARTRATE 50 MG
50 TABLET ORAL EVERY 8 HOURS
Refills: 0 | Status: DISCONTINUED | OUTPATIENT
Start: 2022-01-01 | End: 2022-01-01

## 2022-01-01 RX ORDER — ACETAMINOPHEN 500 MG
975 TABLET ORAL ONCE
Refills: 0 | Status: COMPLETED | OUTPATIENT
Start: 2022-01-01 | End: 2022-01-01

## 2022-01-01 RX ORDER — FUROSEMIDE 40 MG
60 TABLET ORAL ONCE
Refills: 0 | Status: COMPLETED | OUTPATIENT
Start: 2022-01-01 | End: 2022-01-01

## 2022-01-01 RX ORDER — PANTOPRAZOLE SODIUM 20 MG/1
40 TABLET, DELAYED RELEASE ORAL
Refills: 0 | Status: DISCONTINUED | OUTPATIENT
Start: 2022-01-01 | End: 2022-01-01

## 2022-01-01 RX ORDER — DOBUTAMINE HCL 250MG/20ML
2.5 VIAL (ML) INTRAVENOUS
Qty: 500 | Refills: 0 | Status: DISCONTINUED | OUTPATIENT
Start: 2022-01-01 | End: 2022-01-01

## 2022-01-01 RX ORDER — APIXABAN 2.5 MG/1
1 TABLET, FILM COATED ORAL
Qty: 0 | Refills: 0 | DISCHARGE

## 2022-01-01 RX ORDER — AMIODARONE HYDROCHLORIDE 400 MG/1
150 TABLET ORAL ONCE
Refills: 0 | Status: COMPLETED | OUTPATIENT
Start: 2022-01-01 | End: 2022-01-01

## 2022-01-01 RX ORDER — BUMETANIDE 0.25 MG/ML
2 INJECTION INTRAMUSCULAR; INTRAVENOUS EVERY 8 HOURS
Refills: 0 | Status: DISCONTINUED | OUTPATIENT
Start: 2022-01-01 | End: 2022-01-01

## 2022-01-01 RX ORDER — HEPARIN SODIUM 5000 [USP'U]/ML
5000 INJECTION INTRAVENOUS; SUBCUTANEOUS EVERY 6 HOURS
Refills: 0 | Status: DISCONTINUED | OUTPATIENT
Start: 2022-01-01 | End: 2022-01-01

## 2022-01-01 RX ORDER — ALIROCUMAB 75 MG/ML
0 INJECTION, SOLUTION SUBCUTANEOUS
Qty: 0 | Refills: 0 | DISCHARGE

## 2022-01-01 RX ORDER — DIGOXIN 250 MCG
250 TABLET ORAL EVERY 8 HOURS
Refills: 0 | Status: COMPLETED | OUTPATIENT
Start: 2022-01-01 | End: 2022-01-01

## 2022-01-01 RX ORDER — DEXTROSE 50 % IN WATER 50 %
50 SYRINGE (ML) INTRAVENOUS ONCE
Refills: 0 | Status: COMPLETED | OUTPATIENT
Start: 2022-01-01 | End: 2022-01-01

## 2022-01-01 RX ORDER — CHLORHEXIDINE GLUCONATE 213 G/1000ML
1 SOLUTION TOPICAL
Refills: 0 | Status: DISCONTINUED | OUTPATIENT
Start: 2022-01-01 | End: 2022-01-01

## 2022-01-01 RX ORDER — METOPROLOL TARTRATE 50 MG
1 TABLET ORAL
Qty: 0 | Refills: 0 | DISCHARGE

## 2022-01-01 RX ORDER — VASOPRESSIN 20 [USP'U]/ML
0.04 INJECTION INTRAVENOUS
Qty: 40 | Refills: 0 | Status: DISCONTINUED | OUTPATIENT
Start: 2022-01-01 | End: 2022-01-01

## 2022-01-01 RX ORDER — MILRINONE LACTATE 1 MG/ML
0.12 INJECTION, SOLUTION INTRAVENOUS
Qty: 20 | Refills: 0 | Status: DISCONTINUED | OUTPATIENT
Start: 2022-01-01 | End: 2022-01-01

## 2022-01-01 RX ORDER — NOREPINEPHRINE BITARTRATE/D5W 8 MG/250ML
0.06 PLASTIC BAG, INJECTION (ML) INTRAVENOUS
Qty: 8 | Refills: 0 | Status: DISCONTINUED | OUTPATIENT
Start: 2022-01-01 | End: 2022-01-01

## 2022-01-01 RX ORDER — OMEPRAZOLE 10 MG/1
1 CAPSULE, DELAYED RELEASE ORAL
Qty: 0 | Refills: 0 | DISCHARGE

## 2022-01-01 RX ORDER — SODIUM BICARBONATE 1 MEQ/ML
0.05 SYRINGE (ML) INTRAVENOUS
Qty: 75 | Refills: 0 | Status: DISCONTINUED | OUTPATIENT
Start: 2022-01-01 | End: 2022-01-01

## 2022-01-01 RX ORDER — LIDOCAINE HCL 20 MG/ML
2 VIAL (ML) INJECTION
Qty: 2 | Refills: 0 | Status: DISCONTINUED | OUTPATIENT
Start: 2022-01-01 | End: 2022-01-01

## 2022-01-01 RX ORDER — MEXILETINE HYDROCHLORIDE 150 MG/1
150 CAPSULE ORAL EVERY 8 HOURS
Refills: 0 | Status: DISCONTINUED | OUTPATIENT
Start: 2022-01-01 | End: 2022-01-01

## 2022-01-01 RX ORDER — TAMSULOSIN HYDROCHLORIDE 0.4 MG/1
1 CAPSULE ORAL
Qty: 0 | Refills: 0 | DISCHARGE

## 2022-01-01 RX ORDER — SODIUM BICARBONATE 1 MEQ/ML
50 SYRINGE (ML) INTRAVENOUS ONCE
Refills: 0 | Status: COMPLETED | OUTPATIENT
Start: 2022-01-01 | End: 2022-01-01

## 2022-01-01 RX ORDER — INSULIN HUMAN 100 [IU]/ML
10 INJECTION, SOLUTION SUBCUTANEOUS ONCE
Refills: 0 | Status: COMPLETED | OUTPATIENT
Start: 2022-01-01 | End: 2022-01-01

## 2022-01-01 RX ORDER — VANCOMYCIN HCL 1 G
1000 VIAL (EA) INTRAVENOUS ONCE
Refills: 0 | Status: COMPLETED | OUTPATIENT
Start: 2022-01-01 | End: 2022-01-01

## 2022-01-01 RX ORDER — AMIODARONE HYDROCHLORIDE 400 MG/1
1 TABLET ORAL
Qty: 0 | Refills: 0 | DISCHARGE

## 2022-01-01 RX ADMIN — HEPARIN SODIUM 2100 UNIT(S)/HR: 5000 INJECTION INTRAVENOUS; SUBCUTANEOUS at 20:59

## 2022-01-01 RX ADMIN — Medication 50 MILLILITER(S): at 00:00

## 2022-01-01 RX ADMIN — PANTOPRAZOLE SODIUM 40 MILLIGRAM(S): 20 TABLET, DELAYED RELEASE ORAL at 06:56

## 2022-01-01 RX ADMIN — BUMETANIDE 2 MILLIGRAM(S): 0.25 INJECTION INTRAMUSCULAR; INTRAVENOUS at 12:04

## 2022-01-01 RX ADMIN — Medication 50 MILLIEQUIVALENT(S): at 06:56

## 2022-01-01 RX ADMIN — BUMETANIDE 1 MILLIGRAM(S): 0.25 INJECTION INTRAMUSCULAR; INTRAVENOUS at 23:32

## 2022-01-01 RX ADMIN — Medication 25 MILLIGRAM(S): at 05:13

## 2022-01-01 RX ADMIN — PHENYLEPHRINE HYDROCHLORIDE 6.38 MICROGRAM(S)/KG/MIN: 10 INJECTION INTRAVENOUS at 14:00

## 2022-01-01 RX ADMIN — CEFEPIME 100 MILLIGRAM(S): 1 INJECTION, POWDER, FOR SOLUTION INTRAMUSCULAR; INTRAVENOUS at 17:36

## 2022-01-01 RX ADMIN — AMIODARONE HYDROCHLORIDE 33.3 MG/MIN: 400 TABLET ORAL at 06:57

## 2022-01-01 RX ADMIN — Medication 50 MILLIEQUIVALENT(S): at 11:53

## 2022-01-01 RX ADMIN — Medication 300 MILLIGRAM(S): at 23:00

## 2022-01-01 RX ADMIN — Medication 100 MILLIEQUIVALENT(S): at 23:33

## 2022-01-01 RX ADMIN — CEFEPIME 100 MILLIGRAM(S): 1 INJECTION, POWDER, FOR SOLUTION INTRAMUSCULAR; INTRAVENOUS at 05:13

## 2022-01-01 RX ADMIN — Medication 50 MILLIEQUIVALENT(S): at 23:33

## 2022-01-01 RX ADMIN — FENTANYL CITRATE 100 MICROGRAM(S): 50 INJECTION INTRAVENOUS at 08:15

## 2022-01-01 RX ADMIN — HEPARIN SODIUM 2100 UNIT(S)/HR: 5000 INJECTION INTRAVENOUS; SUBCUTANEOUS at 12:56

## 2022-01-01 RX ADMIN — Medication 100 MILLIGRAM(S): at 14:31

## 2022-01-01 RX ADMIN — Medication 17 MICROGRAM(S)/KG/MIN: at 03:34

## 2022-01-01 RX ADMIN — Medication 100 MILLIGRAM(S): at 04:30

## 2022-01-01 RX ADMIN — Medication 975 MILLIGRAM(S): at 18:23

## 2022-01-01 RX ADMIN — CHLORHEXIDINE GLUCONATE 15 MILLILITER(S): 213 SOLUTION TOPICAL at 17:40

## 2022-01-01 RX ADMIN — Medication 10.6 MICROGRAM(S)/KG/MIN: at 12:00

## 2022-01-01 RX ADMIN — TAMSULOSIN HYDROCHLORIDE 0.4 MILLIGRAM(S): 0.4 CAPSULE ORAL at 21:32

## 2022-01-01 RX ADMIN — BUMETANIDE 1 MILLIGRAM(S): 0.25 INJECTION INTRAMUSCULAR; INTRAVENOUS at 09:30

## 2022-01-01 RX ADMIN — Medication 100 MILLIGRAM(S): at 06:30

## 2022-01-01 RX ADMIN — PANTOPRAZOLE SODIUM 40 MILLIGRAM(S): 20 TABLET, DELAYED RELEASE ORAL at 06:32

## 2022-01-01 RX ADMIN — Medication 60 MILLIGRAM(S): at 20:16

## 2022-01-01 RX ADMIN — Medication 100 MILLIGRAM(S): at 23:23

## 2022-01-01 RX ADMIN — HEPARIN SODIUM 10000 UNIT(S): 5000 INJECTION INTRAVENOUS; SUBCUTANEOUS at 21:58

## 2022-01-01 RX ADMIN — PHENYLEPHRINE HYDROCHLORIDE 4.26 MICROGRAM(S)/KG/MIN: 10 INJECTION INTRAVENOUS at 00:00

## 2022-01-01 RX ADMIN — Medication 75 MEQ/KG/HR: at 23:35

## 2022-01-01 RX ADMIN — Medication 75 MEQ/KG/HR: at 00:29

## 2022-01-01 RX ADMIN — Medication 250 MICROGRAM(S): at 14:31

## 2022-01-01 RX ADMIN — Medication 250 MILLIGRAM(S): at 20:16

## 2022-01-01 RX ADMIN — MIDAZOLAM HYDROCHLORIDE 2.27 MG/KG/HR: 1 INJECTION, SOLUTION INTRAMUSCULAR; INTRAVENOUS at 08:00

## 2022-01-01 RX ADMIN — VASOPRESSIN 6 UNIT(S)/MIN: 20 INJECTION INTRAVENOUS at 11:59

## 2022-01-01 RX ADMIN — CEFEPIME 100 MILLIGRAM(S): 1 INJECTION, POWDER, FOR SOLUTION INTRAMUSCULAR; INTRAVENOUS at 21:32

## 2022-01-01 RX ADMIN — Medication 2.5 MILLIGRAM(S): at 04:57

## 2022-01-01 RX ADMIN — FENTANYL CITRATE 5.68 MICROGRAM(S)/KG/HR: 50 INJECTION INTRAVENOUS at 11:57

## 2022-01-01 RX ADMIN — Medication 2 MILLIGRAM(S): at 07:45

## 2022-01-01 RX ADMIN — HEPARIN SODIUM 1900 UNIT(S)/HR: 5000 INJECTION INTRAVENOUS; SUBCUTANEOUS at 05:52

## 2022-01-01 RX ADMIN — Medication 2.5 MILLIGRAM(S): at 04:45

## 2022-01-01 RX ADMIN — HEPARIN SODIUM 10000 UNIT(S): 5000 INJECTION INTRAVENOUS; SUBCUTANEOUS at 03:34

## 2022-01-01 RX ADMIN — AMIODARONE HYDROCHLORIDE 16.7 MG/MIN: 400 TABLET ORAL at 03:34

## 2022-01-01 RX ADMIN — BUMETANIDE 10 MG/HR: 0.25 INJECTION INTRAMUSCULAR; INTRAVENOUS at 23:35

## 2022-01-01 RX ADMIN — MIDAZOLAM HYDROCHLORIDE 2.27 MG/KG/HR: 1 INJECTION, SOLUTION INTRAMUSCULAR; INTRAVENOUS at 17:33

## 2022-01-01 RX ADMIN — Medication 250 MILLIGRAM(S): at 05:46

## 2022-01-01 RX ADMIN — HEPARIN SODIUM 0 UNIT(S)/HR: 5000 INJECTION INTRAVENOUS; SUBCUTANEOUS at 04:42

## 2022-01-01 RX ADMIN — AMIODARONE HYDROCHLORIDE 33.3 MG/MIN: 400 TABLET ORAL at 20:40

## 2022-01-01 RX ADMIN — BUMETANIDE 5 MG/HR: 0.25 INJECTION INTRAMUSCULAR; INTRAVENOUS at 21:32

## 2022-01-01 RX ADMIN — CHLORHEXIDINE GLUCONATE 1 APPLICATION(S): 213 SOLUTION TOPICAL at 05:38

## 2022-01-01 RX ADMIN — AMIODARONE HYDROCHLORIDE 600 MILLIGRAM(S): 400 TABLET ORAL at 20:30

## 2022-01-01 RX ADMIN — HEPARIN SODIUM 2300 UNIT(S)/HR: 5000 INJECTION INTRAVENOUS; SUBCUTANEOUS at 03:36

## 2022-01-01 RX ADMIN — CEFEPIME 100 MILLIGRAM(S): 1 INJECTION, POWDER, FOR SOLUTION INTRAMUSCULAR; INTRAVENOUS at 18:23

## 2022-01-01 RX ADMIN — Medication 2.5 MILLIGRAM(S): at 17:32

## 2022-01-01 RX ADMIN — BUMETANIDE 116 MILLIGRAM(S): 0.25 INJECTION INTRAMUSCULAR; INTRAVENOUS at 21:32

## 2022-01-01 RX ADMIN — Medication 100 MILLIGRAM(S): at 07:00

## 2022-01-01 RX ADMIN — TAMSULOSIN HYDROCHLORIDE 0.4 MILLIGRAM(S): 0.4 CAPSULE ORAL at 23:36

## 2022-01-01 RX ADMIN — SODIUM CHLORIDE 1000 MILLILITER(S): 9 INJECTION INTRAMUSCULAR; INTRAVENOUS; SUBCUTANEOUS at 18:24

## 2022-01-01 RX ADMIN — FENTANYL CITRATE 5.68 MICROGRAM(S)/KG/HR: 50 INJECTION INTRAVENOUS at 17:32

## 2022-01-01 RX ADMIN — FENTANYL CITRATE 5.68 MICROGRAM(S)/KG/HR: 50 INJECTION INTRAVENOUS at 15:47

## 2022-01-01 RX ADMIN — AMIODARONE HYDROCHLORIDE 150 MILLIGRAM(S): 400 TABLET ORAL at 07:00

## 2022-01-01 RX ADMIN — INSULIN HUMAN 10 UNIT(S): 100 INJECTION, SOLUTION SUBCUTANEOUS at 00:00

## 2022-01-01 RX ADMIN — Medication 450 MICROGRAM(S): at 03:34

## 2022-01-01 RX ADMIN — HEPARIN SODIUM 2300 UNIT(S)/HR: 5000 INJECTION INTRAVENOUS; SUBCUTANEOUS at 21:58

## 2022-01-01 RX ADMIN — Medication 100 MILLIEQUIVALENT(S): at 02:49

## 2022-01-01 RX ADMIN — Medication 25 MILLIGRAM(S): at 17:40

## 2022-01-01 RX ADMIN — Medication 25 MILLIGRAM(S): at 18:31

## 2022-01-01 RX ADMIN — AMIODARONE HYDROCHLORIDE 600 MILLIGRAM(S): 400 TABLET ORAL at 04:00

## 2022-01-01 RX ADMIN — HEPARIN SODIUM 2100 UNIT(S)/HR: 5000 INJECTION INTRAVENOUS; SUBCUTANEOUS at 05:37

## 2022-01-01 RX ADMIN — MIDAZOLAM HYDROCHLORIDE 2.27 MG/KG/HR: 1 INJECTION, SOLUTION INTRAMUSCULAR; INTRAVENOUS at 15:40

## 2022-01-01 RX ADMIN — MEXILETINE HYDROCHLORIDE 150 MILLIGRAM(S): 150 CAPSULE ORAL at 16:14

## 2022-01-01 RX ADMIN — Medication 50 MILLIEQUIVALENT(S): at 08:30

## 2022-01-01 RX ADMIN — Medication 8.51 MICROGRAM(S)/KG/MIN: at 23:35

## 2022-01-01 RX ADMIN — CHLORHEXIDINE GLUCONATE 1 APPLICATION(S): 213 SOLUTION TOPICAL at 20:56

## 2022-01-01 RX ADMIN — Medication 10.6 MICROGRAM(S)/KG/MIN: at 08:30

## 2022-01-01 RX ADMIN — Medication 7.5 MG/MIN: at 00:00

## 2022-01-01 RX ADMIN — MEXILETINE HYDROCHLORIDE 150 MILLIGRAM(S): 150 CAPSULE ORAL at 21:32

## 2022-12-25 NOTE — ED PROVIDER NOTE - PROGRESS NOTE DETAILS
Dr. Mata: talked with cardiology. They will come interrogate device Lab Results from 12/24  wbc 14.66  hgb 9  cr 1.87  bun 52    Home medications  amiodarone 200mg daily  apixaban 2.5mg bid  metoprolol succinate 50mg ER  praluent 75mg  torsemide  20mg EF: 20-25%    Lab Results from 12/24  wbc 14.66  hgb 9  cr 1.87  bun 52    Home medications  amiodarone 200mg daily  apixaban 2.5mg bid  metoprolol succinate 50mg ER  praluent 75mg  torsemide  20mg Dr. Mata: rectal temp 101.8  blood cultures, UA, urine cultures ordered. Vanc and cefepime ordered. NS 500cc bolus ordered. Will do chris hydration due to low EF Resident AO: Cardiology at bedside interrogating the device. Pending CT angio for PE rule-out. Dr. Mata: Talked with cardiology fellow Dr. Monique. Device was interrogated and patient is going into V. tach which is why he was shocked.  Cardiology fellow recommends 60 mg of IV Lasix as well as amiodarone.  He would like the patient started on BiPAP.  He wants to hold off on pressors for now as well has canceled the CTA.  Patient is approved for CCU.  Patient admitted. Dr. Mata: Talked with cardiology fellow Dr. Monique. Device was interrogated and patient is going into V. tach which is why he was shocked.  Cardiology fellow recommends 60 mg of IV Lasix as well as amiodarone. He would like the 500cc bolus stopped. Pt only got 50cc of this and it was stop. He would like the patient started on BiPAP.  He wants to hold off on pressors for now as well has canceled the CTA.  Patient is approved for CCU.  Patient admitted.

## 2022-12-25 NOTE — ED PROVIDER NOTE - OBJECTIVE STATEMENT
Patient is an 82-year-old male, with a history of CAD s/p AICD ([id number], placed 6 years ago), paroxysmal A. fib/A. flutter , brought in by EMS for AICD discharge x5.  Patient had episodes of dyspnea prior to discharge. Otherwise, no fever, chest pain, palpitations. No other complaints - no rhinorrhea, sore throat, CP, palpitations, SOB, cough, abd pain, NVD, dysuria, hematuria, new joint pain, FND, rash, trauma.  Patient follows up with Dr. Paris and Dr. Dwyer.    Of note, patient had a recent admission at [facility] for a GI bleed.

## 2022-12-25 NOTE — H&P ADULT - NSHPPHYSICALEXAM_GEN_ALL_CORE
PHYSICAL EXAM:  GENERAL: NAD, lying in bed comfortably  HEAD:  Atraumatic, Normocephalic  EYES: EOMI, PERRLA, conjunctiva and sclera clear  ENT: Moist mucous membranes  NECK: Supple, No JVD  CHEST/LUNG: Decreased bibasilar BS with (+) rales at same location, on bipap, no wheeze, SpO2 100%  HEART: rapid rate and irregular rhythm;1/5 systolic murmurs, rubs, or gallops  ABDOMEN: Bowel sounds present; Soft, Nontender, Nondistended. No hepatomegaly  EXTREMITIES:  2+ Peripheral Pulses, brisk capillary refill. No clubbing, cyanosis, or edema  NERVOUS SYSTEM:  Alert & Oriented X3, speech clear. No deficits   MSK: FROM all 4 extremities, full and equal strength  SKIN: erythematous edematous right elbow- nontender to palpation

## 2022-12-25 NOTE — H&P ADULT - NSICDXPASTMEDICALHX_GEN_ALL_CORE_FT
PAST MEDICAL HISTORY:  AICD (automatic cardioverter/defibrillator) present     Atrial flutter     CAD (coronary artery disease)     Chronic systolic congestive heart failure     Gout     Hyperlipidemia     Paroxysmal atrial fibrillation      PAST MEDICAL HISTORY:  AICD (automatic cardioverter/defibrillator) present     Aortic stenosis     Atrial flutter     CAD (coronary artery disease)     Chronic systolic congestive heart failure     Gout     Hyperlipidemia     Paroxysmal atrial fibrillation

## 2022-12-25 NOTE — H&P ADULT - NSHPLABSRESULTS_GEN_ALL_CORE
LABS:  cret                        9.6    22.32 )-----------( 324      ( 25 Dec 2022 17:25 )             33.0     12-25    137  |  98  |  49<H>  ----------------------------<  253<H>  5.0   |  23  |  2.3<H>    Ca    9.0      25 Dec 2022 17:25  Mg     2.5     12-25    TPro  6.8  /  Alb  4.0  /  TBili  0.6  /  DBili  x   /  AST  29  /  ALT  17  /  AlkPhos  39  12-25    PT/INR - ( 25 Dec 2022 17:25 )   PT: 17.80 sec;   INR: 1.54 ratio         PTT - ( 25 Dec 2022 17:25 )  PTT:30.0 sec    troponin- 0.15    lactate- 3.7    < from: 12 Lead ECG (12.25.22 @ 16:56) >    Ventricular Rate 123 BPM    Atrial Rate 123 BPM    P-R Interval 200 ms    QRS Duration 124 ms    Q-T Interval 324 ms    QTC Calculation(Bazett) 463 ms    P Axis 72 degrees    R Axis 93 degrees    T Axis -49 degrees    Diagnosis Line Sinus tachycardia with occasional Premature ventricular complexes  Possible Left atrial enlargement  Rightward axis  Possible Anterior infarct , age undetermined  ST & T wave abnormality, consider inferolateral ischemia  Abnormal ECG  Confirmed by MARCEL SHEPPARD, [facility]([id number]) on 12/25/2022 10:13:09 PM    < end of copied text >    < from: Xray Chest 1 View- PORTABLE-Urgent (12.25.22 @ 18:15) >    Findings:    Support devices: Left-sided pacemaker    Cardiac/mediastinum/hilum: Cardiac megaly    Lung parenchyma/Pleura: Congestive changes    Skeleton/soft tissues: Degenerative changes    Impression:    CHF.    < end of copied text >

## 2022-12-25 NOTE — H&P ADULT - HISTORY OF PRESENT ILLNESS
81yo M hx of CAD (prev hx of MI), HFrEF s/p AICD, Afib/Aflutter on eliquis, HLD, gout presenting to the ED for AICD discharge x 5. Patient has been experiencing shortness of breath with exertion and chest comfort for several weeks. Today, patient was going to bathroom when he started feeling lightheaded, and short of breath. Patient felt he was becoming disoriented, anxious, and had AICD shock him 5 times, prompting family to call ED. Otherwise, patient denies other complaints including fever, chest pain, palpitations, wheezing, abdominal pain, nausea/vomiting, melena, dysuria. Patient does endorse joint pain, joint swelling- in the right elbow. He initially went to outside hospital for evaluation of his right elbow because "it swelled up like a balloon" that was observed by his PT therapist. During their workup, no infection was found. Patient did have a low hemoglobin prompting a prbc t ransfusion and an EGD/colonoscopy (etiology presumed to be from NSAID use due to neck pain). Hb improved and patient was discharged, but shortness of breath nor chest discomfort was addressed at that time.     At the ED, T- 97.8F, BP: 108/67, HR: 121bpm, RR: 20bpm, SaO2: 97% on RA. Patient was feeling more dyspneic, and O2 saturation decreased to high 80s-> prompting requiring bipap support. Labs significant for elevated WBC- 22.3K, Hb- 9.6, creatinine- 2.3,  troponin- 0.15, BNP- 25K. On VBG- lactate- 3.7. Chest Xray shows cardiomegaly and prominent interstitial infiltrates. IVC is dilated on bedside US. EKG shows sinus tachycardia with PVCs and ST changes. AICD interoogation showed ventricular tachycardia during his shocks. Patient is admitted to the CCU for cardiogenic shock from acute decompensated HF. Patient was started on loaded and started on amiodarone gtt. 1x 60mg IV lasix was given and transitioned to bumex gtt. 1x cefepime and vancomycin were given, blood cultures x2 collected. With bipap, dyspnea improved. Levophed started to maintain MAP>65. heparin gtt initially started for anticoagulation, but will be held for procedure to do invasive monitoring.      83yo M hx of CAD (prev hx of MI), HFrEF s/p AICD, severe AS, Afib/Aflutter on eliquis, CKD (baseline ~ 2.0), HLD, gout presenting to the ED for AICD discharge x 5. Patient has been experiencing shortness of breath with exertion and chest comfort for several weeks. Today, patient was going to bathroom when he started feeling lightheaded, and short of breath. Patient felt he was becoming disoriented, anxious, and had AICD shock him 5 times, prompting family to call ED. Otherwise, patient denies other complaints including fever, chest pain, palpitations, wheezing, abdominal pain, nausea/vomiting, melena, dysuria. Patient does endorse joint pain, joint swelling- in the right elbow. He initially went to outside hospital for evaluation of his right elbow because "it swelled up like a balloon" that was observed by his PT therapist. During their workup, no infection was found. Patient did have a low hemoglobin prompting a prbc t ransfusion and an EGD/colonoscopy (etiology presumed to be from NSAID use due to neck pain). Hb improved and patient was discharged, but shortness of breath nor chest discomfort was addressed at that time.     At the ED, T- 97.8F, BP: 108/67, HR: 121bpm, RR: 20bpm, SaO2: 97% on RA. Patient was feeling more dyspneic, and O2 saturation decreased to high 80s-> prompting requiring bipap support. Labs significant for elevated WBC- 22.3K, Hb- 9.6, creatinine- 2.3,  troponin- 0.15, BNP- 25K. On VBG- lactate- 3.7. Chest Xray shows cardiomegaly and prominent interstitial infiltrates. IVC is dilated on bedside US. EKG shows sinus tachycardia with PVCs and ST changes. AICD interoogation showed ventricular tachycardia during his shocks. Patient is admitted to the CCU for cardiogenic shock from acute decompensated HF. Patient was started on loaded and started on amiodarone gtt. 1x 60mg IV lasix was given and transitioned to bumex gtt. 1x cefepime and vancomycin were given, blood cultures x2 collected. With bipap, dyspnea improved. Levophed started to maintain MAP>65. heparin gtt initially started for anticoagulation, but will be held for procedure to do invasive monitoring.      83yo M hx of CAD (prev hx of MI), HFrEF s/p AICD, severe AS, Afib/Aflutter on eliquis, CKD (baseline ~ 2.0), HLD, gout presenting to the ED for AICD discharge x 5. Patient has been experiencing shortness of breath with exertion and chest comfort for several weeks. Today, patient was going to bathroom when he started feeling lightheaded, and short of breath. Patient felt he was becoming disoriented, anxious, and had AICD shock him 5 times, prompting family to call ED. Otherwise, patient denies other complaints including fever, chest pain, palpitations, wheezing, abdominal pain, nausea/vomiting, melena, dysuria. Patient does endorse joint pain, joint swelling- in the right elbow. He initially went to outside hospital for evaluation of his right elbow because "it swelled up like a balloon" that was observed by his PT therapist. During their workup, no infection was found. Patient did have a low hemoglobin prompting a prbc t ransfusion and an EGD/colonoscopy (etiology presumed to be from NSAID use due to neck pain). Hb improved and patient was discharged, but shortness of breath nor chest discomfort was addressed at that time.     At the ED, T- 97.8F, BP: 108/67, HR: 121bpm, RR: 20bpm, SaO2: 97% on RA. Patient was feeling more dyspneic, and O2 saturation decreased to high 80s-> prompting requiring bipap support. Labs significant for elevated WBC- 22.3K, Hb- 9.6, creatinine- 2.3,  troponin- 0.15, BNP- 25K. On VBG- lactate- 3.7. Chest Xray shows cardiomegaly and prominent interstitial infiltrates. IVC is dilated on bedside US. EKG shows sinus tachycardia with PVCs and ST abnormalities. AICD interoogation showed ventricular tachycardia during his shocks. Patient is admitted to the CCU for cardiogenic shock from acute decompensated HF. Patient was started on loaded and started on amiodarone gtt. 1x 60mg IV lasix was given and transitioned to bumex gtt. 1x cefepime and vancomycin were given, blood cultures x2 collected. With bipap, dyspnea improved. Levophed started to maintain MAP>65. heparin gtt initially started for anticoagulation, but will be held for procedure to do invasive monitoring.

## 2022-12-25 NOTE — ED PROVIDER NOTE - PHYSICAL EXAMINATION
_  Vital signs reviewed; ABCs intact  GENERAL: Well nourished, uncomfortable but not in acute distress  SKIN: Warm, dry  HEAD & NECK: NCAT, supple neck  EYES: EOMI, PER B/L  ENT: MMM  CARD: +Tachycardic, but regular, S1, S2; no murmurs, no rubs, no gallops  RESP: Normal respiratory effort, CTAB, no rales, no wheezing  ABD: Soft, ND, NT, no rebound, no guarding  EXT: Pulses palpable distally  NEUROMSK: Grossly intact  PSYCH: AAOx3, cooperative, appropriate

## 2022-12-25 NOTE — ED PROVIDER NOTE - CLINICAL SUMMARY MEDICAL DECISION MAKING FREE TEXT BOX
82-year-old male presented to the emergency department after his defibrillator went off 5 times.  cardiology was consulted and recommended CCU admission.  Cardiology does not want any pressors at this time but patient will be closely monitored.  Patient alert and oriented x3.

## 2022-12-25 NOTE — ED PROVIDER NOTE - ATTENDING CONTRIBUTION TO CARE
82-year-old male past medical history of paroxysmal atrial fibrillation/a flutter with AICD, CAD, hyperlipidemia presents to ED after his defibrillator went off 5 times earlier today.  No chest pain or shortness of breath.    Patient has a [employer] ICD ([phone])    Const: NAD  Eyes: PERRL, no conjunctival injection  HENT:  Neck supple without meningismus   CV: RRR, Warm, well-perfused extremities  RESP: CTA B/L, mild tachypnea   GI: soft, non-tender, non-distended  MSK: No gross deformities appreciated  Skin: Warm, dry. No rashes  Neuro: Alert, CNs II-XII grossly intact. Sensation and motor function of extremities grossly intact.  Psych: Appropriate mood and affect. 82-year-old male past medical history of paroxysmal atrial fibrillation/a flutter with AICD, CAD, hyperlipidemia presents to ED after his defibrillator went off 5 times earlier today. Patient states he developed chest pain some shortness breath and palpitations when his AICD fired. He sates over the 8 minutes it fired 5 times.  Of note patient states he was just discharged from near [facility] after prolonged stay which include hypovolemic shock due to GI bleed.  Patient had an endoscopy and was given blood.  He states since discharge she is had brown stool no black stool no bright red blood per rectum.    Patient has a [employer] ICD ([phone])    Const: NAD  Eyes: PERRL, no conjunctival injection  HENT:  Neck supple without meningismus   CV: tachycardic, Warm, well-perfused extremities  RESP: CTA B/L, mild tachypnea   GI: soft, non-tender, non-distended  MSK: No gross deformities appreciated  Skin: Warm, dry. No rashes  Neuro: Alert, CNs II-XII grossly intact. Sensation and motor function of extremities grossly intact.  Psych: Appropriate mood and affect.    will do labs, ekg, call EP for device interrogation 82-year-old male past medical history of paroxysmal atrial fibrillation/a flutter with AICD, CAD, hyperlipidemia presents to ED after his defibrillator went off 5 times earlier today. Patient states he developed chest pain some shortness breath and palpitations when his AICD fired. He sates over the 8 minutes it fired 5 times.  Of note patient states he was just discharged from near [facility] after prolonged stay which include hypovolemic shock due to GI bleed.  Patient had an endoscopy and was given blood.  He states since discharge she is had brown stool no black stool no bright red blood per rectum.    Patient has a [employer] ICD ([phone])    Const: NAD  Eyes: PERRL, no conjunctival injection  HENT:  Neck supple without meningismus   CV: tachycardic, Warm, well-perfused extremities  RESP: diffuse expiratory wheezes, mild tachypnea   GI: soft, non-tender, non-distended  MSK: No gross deformities appreciated  Skin: Warm, dry. No rashes  Neuro: Alert, CNs II-XII grossly intact. Sensation and motor function of extremities grossly intact.  Psych: Appropriate mood and affect.    will do labs, ekg, call EP for device interrogation

## 2022-12-25 NOTE — H&P ADULT - NSHPSOCIALHISTORY_GEN_ALL_CORE
(-) smoking  (+) occasional alcohol once in a while  (-) recerational drugs (-) smoking  (+) occasional alcohol once in a while  (-) recreational drugs

## 2022-12-25 NOTE — ED PROVIDER NOTE - CARE PLAN
1 Principal Discharge DX:	AICD discharge  Secondary Diagnosis:	Renal insufficiency  Secondary Diagnosis:	Fever

## 2022-12-25 NOTE — H&P ADULT - ASSESSMENT
81yo M hx of CAD (prev hx of MI), HFrEF s/p AICD, Afib/Aflutter on eliquis, HLD, gout presenting to the ED for AICD discharge x 5.     #Cardiogenic shock  #Acute decompensated heart failure  #Acute hypoxemic respiratory failure  #NSTEMI  #EVE  #Lactic acidosis  #AICD shock 2/2 ventricualar tachycardia  #CAD  #HLD  #BPH  #Elbow erythema    PLAN   81yo M hx of CAD (prev hx of MI), HFrEF s/p AICD, AS, Afib/Aflutter on eliquis, CKD3 (baseline ~2.0), HLD, gout presenting to the ED for AICD discharge x 5.     #Cardiogenic shock  #Acute decompensated heart failure  #Acute hypoxemic respiratory failure 2/2 pulmonary edema  #NSTEMI  #CKD  #Lactic acidosis  #AICD shock 2/2 ventricualar tachycardia  #CAD  #HLD  #BPH  #Elbow erythema      PLAN:    CNS: avoid sedation    HEENT:  Oral care    PULMONARY:  HOB @ 45 degrees, continue bipap overnight, reassess ABG @ 6am, keep SaO2 92-96%, f/u AM Chest Xray    CARDIOVASCULAR:    - [place]-jose placed, increase bumex to 2mg/hr, TTE  - A-line placed. maintain MAP 65-70, start levophed if MAP drops below 65,  - cont amio gtt as per protocol. digoxin added to control HR 2/2 atrial fibrillation with RVR. Avoid BBs, CCBs  - trend troponin    GI: cont PPI, dash/tlc diet with fluid restriction    RENAL:  F/u  lytes.  maintain K>4.0, Mg>2.0,. accurate I/O    INFECTIOUS DISEASE:    HEMATOLOGICAL:  start heparin gtt    ENDOCRINE:  Follow up FS.  Insulin protocol if needed.    MUSCULOSKELETAL:    CODE STATUS: FULL CODE    DISPOSITION: Pt requires continued monitoring in the MICU     83yo M hx of CAD (prev hx of MI), HFrEF s/p AICD, AS, Afib/Aflutter on eliquis, CKD3 (baseline ~2.0), HLD, gout presenting to the ED for AICD discharge x 5.     #Cardiogenic shock  #Acute decompensated heart failure  #Acute hypoxemic respiratory failure 2/2 pulmonary edema  #NSTEMI  #CKD  #Lactic acidosis  #AICD shock 2/2 ventricualar tachycardia  #CAD  #HLD  #BPH  #Elbow erythema      PLAN:    CNS: avoid sedation    HEENT:  Oral care    PULMONARY:  HOB @ 45 degrees, continue bipap overnight, reassess ABG @ 6am, keep SaO2 92-96%, f/u AM Chest Xray    CARDIOVASCULAR:    - [place]-jose placed, currently volume overloaded. increase bumex to 2mg/hr, TTE. Avoid over-diuresing given AS.  - A-line placed. maintain MAP 65-70, cont levophed. start dobutamine to improve CO  - cont amio gtt as per protocol. digoxin added to control HR 2/2 atrial fibrillation with RVR (450 x1, followed by 250 q8H x 2). Avoid BBs, CCBs, ACEi  - trend troponin. check AM EKG    GI: cont PPI, dash/tlc diet with fluid restriction    RENAL:  F/u  lytes.  maintain K>4.0, Mg>2.0,. accurate I/O, bueno inserted to monitor urine output    INFECTIOUS DISEASE: s/p cefepime and vancomycin. monitor leukocytosis. check MRSA. f/u Blood Cx, UCx, UA, procal  - lactate improved    HEMATOLOGICAL:  start heparin gtt. monitor PTT 60-90. had a previous admission at outside hospital for suspected GIB from NSAID use    ENDOCRINE:  Follow up FS.  Insulin protocol if needed. Hold allopurinol for now    MUSCULOSKELETAL: bedrest.     CODE STATUS: FULL CODE    DISPOSITION: Pt requires continued monitoring in the CCU     83yo M hx of CAD (prev hx of MI), HFrEF s/p AICD, AS, Afib/Aflutter on eliquis, CKD3 (baseline ~2.0), HLD, gout presenting to the ED for AICD discharge x 5.     #Cardiogenic shock  #Acute decompensated heart failure  #Acute hypoxemic respiratory failure 2/2 pulmonary edema  #NSTEMI  #CKD  #Lactic acidosis  #AICD shock 2/2 ventricualar tachycardia  #CAD  #HLD  #BPH  #Elbow erythema      PLAN:    CNS: avoid sedation    HEENT:  Oral care    PULMONARY:  HOB @ 45 degrees, continue bipap overnight, reassess ABG @ 6am, keep SaO2 92-96%, f/u AM Chest Xray    CARDIOVASCULAR:    - [place]-jose placed, currently volume overloaded. increase bumex to 2mg/hr, TTE. Avoid over-diuresing given AS.  - A-line placed. maintain MAP 65-70, cont levophed. start dobutamine to improve CO  - cont amio gtt as per protocol. digoxin added to control HR 2/2 atrial fibrillation with RVR (450 x1, followed by 250 q8H x 2). Avoid BBs, CCBs, ACEi  - trend troponin. check AM EKG    GI: cont PPI, dash/tlc diet with fluid restriction    RENAL:  F/u  lytes.  maintain K>4.0, Mg>2.0,. accurate I/O, bueno inserted to monitor urine output    INFECTIOUS DISEASE: s/p cefepime and vancomycin. monitor leukocytosis. check MRSA. f/u Blood Cx, UCx, UA, procal  - lactate improved    HEMATOLOGICAL:  start heparin gtt. monitor PTT 60-90. had a previous admission at outside hospital for suspected GIB from NSAID use    ENDOCRINE:  Follow up FS.  Insulin protocol if needed. Hold allopurinol for now    MUSCULOSKELETAL: bedrest.     CODE STATUS: FULL CODE. Discussed with patient about GOC. Patient wants full code    DISPOSITION: Pt requires continued monitoring in the CCU    NOTE: EMERGENCY CONTACT IS THE DAUGHTER- TATYANA JACKSON- NOT THE WIFE.  - CAN CHECK PREVIOUS RECORDS FROM OUTSIDE HOSPITAL ON HIS LABTOP IF NEEDED   81yo M hx of CAD (prev hx of MI), HFrEF s/p AICD, AS, Afib/Aflutter on eliquis, CKD3 (baseline ~2.0), HLD, gout presenting to the ED for AICD discharge x 5.     #Cardiogenic shock  #Acute decompensated heart failure  #Acute hypoxemic respiratory failure 2/2 pulmonary edema  #NSTEMI  #CKD  #Lactic acidosis  #AICD shock 2/2 ventricualar tachycardia  #CAD  #HLD  #BPH  #Leukocytosis  #Elbow erythema      PLAN:    CNS: avoid sedation    HEENT:  Oral care    PULMONARY:  HOB @ 45 degrees, continue bipap overnight, reassess ABG @ 6am, keep SaO2 92-96%, f/u AM Chest Xray    CARDIOVASCULAR:    - [place]-jose placed, currently volume overloaded. increase bumex to 2mg/hr, TTE. Avoid over-diuresing given AS.  - A-line placed. maintain MAP 65-70, cont levophed. start dobutamine to improve CO  - cont amio gtt as per protocol. digoxin added to control HR 2/2 atrial fibrillation with RVR (450 x1, followed by 250 q8H x 2). Avoid BBs, CCBs, ACEi  - trend troponin. check AM EKG    GI: cont PPI, dash/tlc diet with fluid restriction    RENAL:  F/u  lytes.  maintain K>4.0, Mg>2.0,. accurate I/O, bueno inserted to monitor urine output    INFECTIOUS DISEASE: s/p cefepime and vancomycin. monitor leukocytosis. check MRSA. f/u Blood Cx, UCx, UA, procal  - lactate improved    HEMATOLOGICAL:  start heparin gtt. monitor PTT 60-90. had a previous admission at outside hospital for suspected GIB from NSAID use    ENDOCRINE:  Follow up FS.  Insulin protocol if needed. Hold allopurinol for now    MUSCULOSKELETAL: bedrest.     CODE STATUS: FULL CODE. Discussed with patient about GOC. Patient wants full code    DISPOSITION: Pt requires continued monitoring in the CCU    NOTE: EMERGENCY CONTACT IS THE DAUGHTER- TATYANA JACKSON- NOT THE WIFE.  - CAN CHECK PREVIOUS RECORDS FROM OUTSIDE HOSPITAL ON HIS LABTOP IF NEEDED   81yo M hx of CAD (prev hx of MI), HFrEF s/p AICD, AS, Afib/Aflutter on eliquis, CKD3 (baseline ~2.0), HLD, gout presenting to the ED for AICD discharge x 5.     #Cardiogenic shock  #Acute decompensated heart failure  #Acute hypoxemic respiratory failure 2/2 pulmonary edema  #NSTEMI  #CKD  #Lactic acidosis  #AICD shock 2/2 ventricualar tachycardia  #CAD  #HLD  #BPH  #Leukocytosis  #Elbow erythema      PLAN:    CNS: avoid sedation    HEENT:  Oral care    PULMONARY:  HOB @ 45 degrees, continue bipap overnight, reassess ABG @ 6am, keep SaO2 92-96%, f/u AM Chest Xray    CARDIOVASCULAR:    - [place]-jose placed, currently volume overloaded. increase bumex to 2mg/hr, TTE. Avoid over-diuresing given AS.  - A-line placed. maintain MAP 65-70, cont levophed. start dobutamine to improve CO  - cont amio gtt as per protocol. digoxin added to control HR 2/2 atrial fibrillation with RVR (450 x1, followed by 250 q8H x 2). Avoid BBs, CCBs, ACEi  - trend troponin. check AM EKG  - cont telemetry monitoring    GI: cont PPI, dash/tlc diet with fluid restriction    RENAL:  F/u  lytes.  maintain K>4.0, Mg>2.0,. accurate I/O, bueno inserted to monitor urine output    INFECTIOUS DISEASE: s/p cefepime and vancomycin. monitor leukocytosis. check MRSA. f/u Blood Cx, UCx, UA, procal  - lactate improved    HEMATOLOGICAL:  start heparin gtt. monitor PTT 60-90. had a previous admission at outside hospital for suspected GIB from NSAID use    ENDOCRINE:  Follow up FS.  Insulin protocol if needed. Hold allopurinol for now    MUSCULOSKELETAL: bedrest.     CODE STATUS: FULL CODE. Discussed with patient about GOC. Patient wants full code    DISPOSITION: Pt requires continued monitoring in the CCU    NOTE: EMERGENCY CONTACT IS THE DAUGHTER- TATYANA JACKSON- NOT THE WIFE.  - CAN CHECK PREVIOUS RECORDS FROM OUTSIDE HOSPITAL ON HIS LABTOP IF NEEDED   83yo M hx of CAD (prev hx of MI), HFrEF s/p AICD, AS, Afib/Aflutter on eliquis, CKD3 (baseline ~2.0), HLD, gout presenting to the ED for AICD discharge x 5.     #Cardiogenic shock  #Acute decompensated heart failure  #Acute hypoxemic respiratory failure 2/2 pulmonary edema  #NSTEMI  #CKD  #Lactic acidosis  #AICD shock 2/2 ventricualar tachycardia  #CAD  #HLD  #BPH  #Leukocytosis  #Elbow erythema vs cellulitis?      PLAN:    CNS: avoid sedation    HEENT:  Oral care    PULMONARY:  HOB @ 45 degrees, continue bipap overnight, reassess ABG @ 6am, keep SaO2 92-96%, f/u AM Chest Xray    CARDIOVASCULAR:    - [place]-jose placed, currently volume overloaded. increase bumex to 2mg/hr, TTE. Avoid over-diuresing given AS.  - A-line placed. maintain MAP 65-70, cont levophed. start dobutamine to improve CO  - cont amio gtt as per protocol. digoxin added to control HR 2/2 atrial fibrillation with RVR (450 x1, followed by 250 q8H x 2). Avoid BBs, CCBs, ACEi  - trend troponin. check AM EKG  - cont telemetry monitoring    GI: cont PPI, dash/tlc diet with fluid restriction    RENAL:  F/u  lytes.  maintain K>4.0, Mg>2.0,. accurate I/O, bueno inserted to monitor urine output    INFECTIOUS DISEASE: s/p cefepime and vancomycin. monitor leukocytosis. check MRSA. f/u Blood Cx, UCx, UA, procal. start cefazolin  - lactate improved    HEMATOLOGICAL:  start heparin gtt. monitor PTT 60-90. had a previous admission at outside hospital for suspected GIB from NSAID use    ENDOCRINE:  Follow up FS.  Insulin protocol if needed. Hold allopurinol for now    MUSCULOSKELETAL: bedrest.     CODE STATUS: FULL CODE. Discussed with patient about GOC. Patient wants full code    DISPOSITION: Pt requires continued monitoring in the CCU    NOTE: EMERGENCY CONTACT IS THE DAUGHTER- TATYANA JACKSON- NOT THE WIFE.  - CAN CHECK PREVIOUS RECORDS FROM OUTSIDE HOSPITAL ON HIS LABTOP IF NEEDED

## 2022-12-26 NOTE — CONSULT NOTE ADULT - NS ATTEND AMEND GEN_ALL_CORE FT
complex patient  critically ill   Recurrent VT  frequent PVCs  Persistent AF  I interrogated and reprogrammed device    recommend   -Amiodarone IV load then 400 mg daily  -Mexiletine 150 q8h  -[facility]  -TAVR evaluation

## 2022-12-26 NOTE — CHART NOTE - NSCHARTNOTEFT_GEN_A_CORE
Patient went into V tach two times, was shocked twice by his AICD. Received stat dose of IV metoprolol 2.5 mg. Heart rate improved. Will start patient on po metoprolol 25mg BID. Will continue amiodarone gtt at 0.5 overnight. Case discussed with attending Dr. Ornelas and fellow and bedside. Patient went into V tach two times, was shocked twice by his AICD. Received stat dose of IV metoprolol 2.5 mg. Heart rate improved. Will start patient on po metoprolol 25mg BID. Will continue amiodarone gtt at 0.5 overnight. Case discussed with attending Dr. Ornelas and fellow and bedside.      12/26      4pm  lactate 1.00  CO  5.9  CI   2.5  SV  61  MAP  73  CVP  6  SVR  908    0.96  Buddy  3

## 2022-12-26 NOTE — CHART NOTE - NSCHARTNOTEFT_GEN_A_CORE
12/26 10pm    lactate 0.90  pH: 7.48   CO  4.1  CI   1.7  SV  44  MAP  65  CVP  10  SVR  1073    0.59  Buddy  2.3 12/26 10pm    lactate 0.90  pH: 7.48   CO  4.1  CI   1.7  MAP  65  CVP  10  SVR  1073    0.59  Buddy  2.3    If CVP trends upward, give 1 bumex push. (spot diuretics throughout night)  Start Brendon, titrate MAP to 70, then start Milrinone.  After milrinone, Titrate to MAP of 65. 12/26 10pm    lactate 0.90  pH: 7.48   CO  4.1  CI   1.7  MAP  65  CVP  10  SVR  1073    0.59  Buddy  2.3    If CVP trends upward, give 1 bumex push. (spot diuretics throughout night)  Start Brendon, titrate MAP to 70    11PM  - Patient had 2x episodes of monomorphic VTs, had 2x AICD shocks. Mexiletine discontinued. Started patient on lidocaine gtt, and lidocaine bolus. 12/26 10pm    lactate 0.90  pH: 7.48   CO  4.1  CI   1.7  MAP  65  CVP  10  SVR  1073    0.59  Buddy  2.3    If CVP trends upward, give 1 bumex push. (spot diuretics throughout night)  Start Brendon, titrate MAP to 70    11PM  - Patient had 2x episodes of monomorphic VTs, had 2x AICD shocks. Mexiletine discontinued. Started patient on lidocaine gtt, and lidocaine bolus.    All plans discussed with cardio fellow 12/26 10pm    lactate 0.90  pH: 7.48   CO  4.1  CI   1.7  MAP  65  CVP  10  SVR  1073    0.59  Buddy  2.3    If CVP trends upward, give 1 bumex push. (spot diuretics throughout night)  Start Brendon, titrate MAP to 70    11PM  - Patient had 2x episodes of monomorphic VTs, had 2x AICD shocks. Mexiletine discontinued. Started patient on lidocaine gtt, and lidocaine bolus.    All plans discussed with cardio fellow    3:50AM  - Patient had multiple episodes of monomorphic VT with multiple AICD shocks. Increased Amio drip to 1mg and gave 150mL Amio bolus. HR returned to 90's, MAP 68. 12/26 10pm    lactate 0.90  pH: 7.48   CO  4.1  CI   1.7  MAP  65  CVP  10  SVR  1073    0.59  Buddy  2.3    If CVP trends upward, give 1 bumex push. (spot diuretics throughout night)  Start Brendon, titrate MAP to 70    11PM  - Patient had 2x episodes of monomorphic VTs, had 2x AICD shocks. Mexiletine discontinued. Started patient on lidocaine gtt, and lidocaine bolus.    All plans discussed with cardio fellow    3:50AM  - Patient had multiple episodes of monomorphic VT with multiple AICD shocks. Increased Amio drip to 1mg and gave 150mL Amio bolus. HR returned to 90's, MAP 68.    4-5AM  - still having multiple AICD shocks. 2 x 2.5mg IV metoprolol push 12/26 10pm    lactate 0.90  pH: 7.48   CO  4.1  CI   1.7  MAP  65  CVP  10  SVR  1073    0.59  Buddy  2.3    If CVP trends upward, give 1 bumex push. (spot diuretics throughout night)  Start Brendon, titrate MAP to 70    11PM  - Patient had 2x episodes of monomorphic VTs, had 2x AICD shocks. Mexiletine discontinued. Started patient on lidocaine gtt, and lidocaine bolus.      12/27    3:50AM  - Patient had multiple episodes of monomorphic VT with multiple AICD shocks. Increased Amio drip to 1mg and gave 150mL Amio bolus. HR returned to 90's, MAP 68.    4-5AM  - still having multiple AICD shocks. 2 x 2.5mg IV metoprolol push    6AM    lactate 1.2  pH: 7.40   CO  4.7  CI   2.0  MAP  63  CVP  10  SVR  902    0.65  Buddy  1.9      All plans discussed with cardio fellow 12/26 10pm    lactate 0.90  pH: 7.48   CO  4.1  CI   1.7  MAP  65  CVP  10  SVR  1073    0.59  Buddy  2.3    If CVP trends upward, give 1 bumex push. (spot diuretics throughout night)  Start Brendon, titrate MAP to 70    11PM  - Patient had 2x episodes of monomorphic VTs, had 2x AICD shocks. Mexiletine discontinued. Started patient on lidocaine gtt, and lidocaine bolus.      12/27    3:50AM  - Patient had multiple episodes of monomorphic VT with multiple AICD shocks. Increased Amio drip to 1mg and gave 150mL Amio bolus. HR returned to 90's, MAP 68.    4-5AM  - still having multiple AICD shocks. 2 x 2.5mg IV metoprolol push    6AM    lactate 1.2  pH: 7.40   CO  4.7  CI   2.0  SV- 61  MAP  63  CVP  10  SVR  902    0.65  Buddy  1.9      All plans discussed with cardio fellow 12/26 10pm    lactate 0.90  pH: 7.48   CO  4.1  CI   1.7  MAP  65  CVP  10  SVR  1073    0.59  Buddy  2.3    If CVP trends upward, give 1 bumex push. (spot diuretics throughout night)  Start Brendon, titrate MAP to 70    11PM  - Patient had 2x episodes of monomorphic VTs, had 2x AICD shocks. Mexiletine discontinued. Started patient on lidocaine gtt, and lidocaine bolus.      12/27    3:50AM  - Patient had multiple episodes of monomorphic VT with multiple AICD shocks. Increased Amio drip to 1mg and gave 150mL Amio bolus. HR returned to 90's, MAP 68.    4-5AM  - still having multiple AICD shocks. 2 x 2.5mg IV metoprolol push    6AM    lactate 1.2  pH: 7.40   CO  4.7  CI   2.0  SV- 61  MAP  63  CVP  10  SVR  902    0.65  Buddy  1.9    6:30AM  - Patient had sustained VT. gave 120J shock. patient recovered      All plans discussed with cardio fellow 12/26 10pm    lactate 0.90  pH: 7.48   CO  4.1  CI   1.7  MAP  65  CVP  10  SVR  1073    0.59  Buddy  2.3    If CVP trends upward, give 1 bumex push. (spot diuretics throughout night)  Start Brendon, titrate MAP to 70    11PM  - Patient had 2x episodes of monomorphic VTs, had 2x AICD shocks. Mexiletine discontinued. Started patient on lidocaine gtt, and lidocaine bolus.      12/27    3:50AM  - Patient had multiple episodes of monomorphic VT with multiple AICD shocks. Increased Amio drip to 1mg and gave 150mL Amio bolus. HR returned to 90's, MAP 68.    4-5AM  - still having multiple AICD shocks. 2 x 2.5mg IV metoprolol push    6AM    lactate 1.2  pH: 7.40   CO  4.7  CI   2.0  SV- 61  MAP  63  CVP  10  SVR  902    0.65  Buddy  1.9    6:30AM  - Patient had sustained VT. gave 120J shock. patient recovered. given renal function improved to b aseline, lidocaine gtt increased to 2      All plans discussed with cardio fellow

## 2022-12-26 NOTE — CHART NOTE - NSCHARTNOTEFT_GEN_A_CORE
Patient's cardiologist Dr. Dwyer called. Per Dr. Dwyer, patient has history of cardiomyopathy, HFrEF, a. fib, aortic stenosis. Patient was admitted at [place] recently for a GI Bleed. He had a history of moderate to severe aortic stenosis, and per the most recent echo two days ago, patient had severe aortic stenosis. No valve replacement was discussed previously. Patient also has a known history of paroxysmal a. fib, was unsuccessfully cardioverted in September, started on difetilide 250 mg BID briefly, did not tolerate and was switched to amiodarone 200mg. Patient has never had v tach before and had never been shocked by his AICD before.     Dr. Dwyer can be reached at [phone]. Call with questions or updates.

## 2022-12-26 NOTE — CONSULT NOTE ADULT - SUBJECTIVE AND OBJECTIVE BOX
Patient is a 82y old  Male who presents with a chief complaint of acute decompesated heart failure (25 Dec 2022 22:13)        HPI:  83yo M hx of CAD (prev hx of MI), HFrEF s/p AICD, severe AS, Afib/Aflutter on eliquis, CKD (baseline ~ 2.0), HLD, gout presenting to the ED for AICD discharge x 5. Patient has been experiencing shortness of breath with exertion and chest comfort for several weeks. Today, patient was going to bathroom when he started feeling lightheaded, and short of breath. Patient felt he was becoming disoriented, anxious, and had AICD shock him 5 times, prompting family to call ED. Otherwise, patient denies other complaints including fever, chest pain, palpitations, wheezing, abdominal pain, nausea/vomiting, melena, dysuria. Patient does endorse joint pain, joint swelling- in the right elbow. He initially went to outside hospital for evaluation of his right elbow because "it swelled up like a balloon" that was observed by his PT therapist. During their workup, no infection was found. Patient did have a low hemoglobin prompting a prbc t ransfusion and an EGD/colonoscopy (etiology presumed to be from NSAID use due to neck pain). Hb improved and patient was discharged, but shortness of breath nor chest discomfort was addressed at that time.     At the ED, T- 97.8F, BP: 108/67, HR: 121bpm, RR: 20bpm, SaO2: 97% on RA. Patient was feeling more dyspneic, and O2 saturation decreased to high 80s-> prompting requiring bipap support. Labs significant for elevated WBC- 22.3K, Hb- 9.6, creatinine- 2.3,  troponin- 0.15, BNP- 25K. On VBG- lactate- 3.7. Chest Xray shows cardiomegaly and prominent interstitial infiltrates. IVC is dilated on bedside US. EKG shows sinus tachycardia with PVCs and ST abnormalities. AICD interoogation showed ventricular tachycardia during his shocks. Patient is admitted to the CCU for cardiogenic shock from acute decompensated HF. Patient was started on loaded and started on amiodarone gtt. 1x 60mg IV lasix was given and transitioned to bumex gtt. 1x cefepime and vancomycin were given, blood cultures x2 collected. With bipap, dyspnea improved. Levophed started to maintain MAP>65. heparin gtt initially started for anticoagulation, but will be held for procedure to do invasive monitoring.      (25 Dec 2022 22:13)      Electrophysiology:  82y Male with h/o CAD, MI, HFrEF s/p AICD ([place])    REVIEW OF SYSTEMS    [ ] A ten-point review of systems was otherwise negative except as noted.  [ ] Due to altered mental status/intubation, subjective information were not able to be obtained from the patient. History was obtained, to the extent possible, from review of the chart and collateral sources of information.      PAST MEDICAL & SURGICAL HISTORY:  CAD (coronary artery disease)      Gout      Paroxysmal atrial fibrillation      Atrial flutter      Chronic systolic congestive heart failure      AICD (automatic cardioverter/defibrillator) present      Hyperlipidemia      Aortic stenosis          Home Medications:  allopurinol 100 mg oral tablet: 1 tab(s) orally once a day (25 Dec 2022 22:38)  amiodarone 200 mg oral tablet: 1 tab(s) orally once a day (25 Dec 2022 22:38)  Eliquis 2.5 mg oral tablet: 1 tab(s) orally 2 times a day (25 Dec 2022 22:38)  metoprolol succinate 50 mg oral tablet, extended release: 1 tab(s) orally once a day (25 Dec 2022 22:38)  omeprazole 20 mg oral delayed release capsule: 1 cap(s) orally once a day (25 Dec 2022 22:38)  Praluent Pen 75 mg/mL subcutaneous solution: subcutaneous every 2 weeks (25 Dec 2022 22:38)  tamsulosin 0.4 mg oral capsule: 1 cap(s) orally once a day (25 Dec 2022 22:38)      Allergies:  No Known Allergies  statins (Muscle Pain)      FAMILY HISTORY:      SOCIAL HISTORY:    CIGARETTES:  ALCOHOL:  MARIJUANA:  ILLICIT DRUGS:        PREVIOUS DIAGNOSTIC TESTING:      ECHO  FINDINGS:    STRESS  FINDINGS:    CATHETERIZATION  FINDINGS:    ELECTROPHYSIOLOGY STUDY  FINDINGS:    CAROTID ULTRASOUND:  FINDINGS    VENOUS DUPLEX SCAN:  FINDINGS:    CHEST CT PULMONARY ANGIO with IV Contrast:  FINDINGS:      MEDICATIONS  (STANDING):  aMIOdarone Infusion 1 mG/Min (33.3 mL/Hr) IV Continuous <Continuous>  aMIOdarone Infusion 0.5 mG/Min (16.7 mL/Hr) IV Continuous <Continuous>  ceFAZolin   IVPB 1000 milliGRAM(s) IV Intermittent every 8 hours  chlorhexidine 2% Cloths 1 Application(s) Topical <User Schedule>  digoxin  Injectable 250 MICROGram(s) IV Push every 8 hours  heparin  Infusion.  Unit(s)/Hr (23 mL/Hr) IV Continuous <Continuous>  norepinephrine Infusion 0.057 MICROgram(s)/kG/Min (12.1 mL/Hr) IV Continuous <Continuous>  pantoprazole    Tablet 40 milliGRAM(s) Oral before breakfast  tamsulosin 0.4 milliGRAM(s) Oral at bedtime    MEDICATIONS  (PRN):  heparin   Injectable 58491 Unit(s) IV Push every 6 hours PRN For aPTT less than 40  heparin   Injectable 5000 Unit(s) IV Push every 6 hours PRN For aPTT between 40 - 57      Vital Signs Last 24 Hrs  T(C): 37.9 (26 Dec 2022 12:00), Max: 38.8 (25 Dec 2022 18:04)  T(F): 100.2 (26 Dec 2022 12:00), Max: 101.9 (25 Dec 2022 18:04)  HR: 108 (26 Dec 2022 13:00) (97 - 121)  BP: 146/60 (26 Dec 2022 08:11) (88/54 - 146/60)  BP(mean): 86 (26 Dec 2022 08:11) (61 - 92)  RR: 23 (26 Dec 2022 13:00) (18 - 37)  SpO2: 96% (26 Dec 2022 13:00) (93% - 100%)    Parameters below as of 26 Dec 2022 13:00  Patient On (Oxygen Delivery Method): nasal cannula  O2 Flow (L/min): 3      PHYSICAL EXAM:    GENERAL: In no apparent distress, well nourished, and hydrated.  HEAD:  Atraumatic, Normocephalic  EYES: EOMI, PERRLA, conjunctiva and sclera clear  NECK: Supple and normal thyroid.  No JVD or carotid bruit.  Carotid pulse is 2+ bilaterally.  HEART: Regular rate and rhythm; No murmurs, rubs, or gallops.  PULMONARY: Clear to auscultation and perfusion.  No rales, wheezing, or rhonchi bilaterally.  ABDOMEN: Soft, Nontender, Nondistended; Bowel sounds present  EXTREMITIES:  2+ Peripheral Pulses, No clubbing, cyanosis, or edema  NEUROLOGICAL: Grossly nonfocal    I&O's Detail    25 Dec 2022 07:01  -  26 Dec 2022 07:00  --------------------------------------------------------  IN:    Amiodarone: 66.6 mL    Amiodarone: 100.2 mL    Bumetanide: 40 mL    DOBUTamine: 51 mL    Heparin Infusion: 80 mL    IV PiggyBack: 50 mL  Total IN: 387.8 mL    OUT:    Indwelling Catheter - Urethral (mL): 1325 mL  Total OUT: 1325 mL    Total NET: -937.2 mL      26 Dec 2022 07:  -  26 Dec 2022 13:15  --------------------------------------------------------  IN:    Amiodarone: 83.5 mL    Heparin Infusion: 95 mL    Oral Fluid: 30 mL  Total IN: 208.5 mL    OUT:    Indwelling Catheter - Urethral (mL): 990 mL  Total OUT: 990 mL    Total NET: -781.5 mL        Daily Height in cm: 180.34 (25 Dec 2022 23:00)    Daily     INTERPRETATION OF TELEMETRY:    EC Lead ECG:   Ventricular Rate 123 BPM    Atrial Rate 123 BPM    P-R Interval 200 ms    QRS Duration 124 ms    Q-T Interval 324 ms    QTC Calculation(Bazett) 463 ms    P Axis 72 degrees    R Axis 93 degrees    T Axis -49 degrees    Diagnosis Line Sinus tachycardia with occasional Premature ventricular complexes  Possible Left atrial enlargement  Rightward axis  Possible Anterior infarct , age undetermined  ST & T wave abnormality, consider inferolateral ischemia  Abnormal ECG  Confirmed by MARCEL SHEPPARD, NATALIIA([id number]) on 2022 10:13:09 PM (22 @ 16:56)        LABS:                        8.9    16.79 )-----------( 258      ( 26 Dec 2022 09:43 )             29.3         140  |  100  |  58<H>  ----------------------------<  150<H>  4.8   |  17  |  2.6<H>    Ca    8.8      26 Dec 2022 03:30  Mg     2.6         TPro  6.2  /  Alb  3.7  /  TBili  0.4  /  DBili  x   /  AST  59<H>  /  ALT  17  /  AlkPhos  47      CARDIAC MARKERS ( 26 Dec 2022 12:00 )  x     / 0.99 ng/mL / x     / x     / x      CARDIAC MARKERS ( 26 Dec 2022 03:30 )  x     / 0.91 ng/mL / x     / x     / x      CARDIAC MARKERS ( 25 Dec 2022 17:25 )  x     / 0.15 ng/mL / x     / x     / x          PT/INR - ( 25 Dec 2022 17:25 )   PT: 17.80 sec;   INR: 1.54 ratio         PTT - ( 26 Dec 2022 12:00 )  PTT:55.8 sec  Urinalysis Basic - ( 26 Dec 2022 03:00 )    Color: Light Yellow / Appearance: Clear / S.012 / pH: x  Gluc: x / Ketone: Negative  / Bili: Negative / Urobili: <2 mg/dL   Blood: x / Protein: Negative / Nitrite: Negative   Leuk Esterase: Negative / RBC: x / WBC x   Sq Epi: x / Non Sq Epi: x / Bacteria: x      BNPSerum Pro-Brain Natriuretic Peptide: 89270 pg/mL ( @ 17:25)        COVID-19 PCR: NotDetec (22 @ 18:36)        RADIOLOGY & ADDITIONAL STUDIES:       Patient is a 82y old  Male who presents with a chief complaint of acute decompesated heart failure (25 Dec 2022 22:13)        HPI:  81yo M hx of CAD (prev hx of MI), HFrEF s/p AICD, severe AS, Afib/Aflutter on eliquis, CKD (baseline ~ 2.0), HLD, gout presenting to the ED for AICD discharge x 5. Patient has been experiencing shortness of breath with exertion and chest comfort for several weeks. Today, patient was going to bathroom when he started feeling lightheaded, and short of breath. Patient felt he was becoming disoriented, anxious, and had AICD shock him 5 times, prompting family to call ED. Otherwise, patient denies other complaints including fever, chest pain, palpitations, wheezing, abdominal pain, nausea/vomiting, melena, dysuria. Patient does endorse joint pain, joint swelling- in the right elbow. He initially went to outside hospital for evaluation of his right elbow because "it swelled up like a balloon" that was observed by his PT therapist. During their workup, no infection was found. Patient did have a low hemoglobin prompting a prbc t ransfusion and an EGD/colonoscopy (etiology presumed to be from NSAID use due to neck pain). Hb improved and patient was discharged, but shortness of breath nor chest discomfort was addressed at that time.     At the ED, T- 97.8F, BP: 108/67, HR: 121bpm, RR: 20bpm, SaO2: 97% on RA. Patient was feeling more dyspneic, and O2 saturation decreased to high 80s-> prompting requiring bipap support. Labs significant for elevated WBC- 22.3K, Hb- 9.6, creatinine- 2.3,  troponin- 0.15, BNP- 25K. On VBG- lactate- 3.7. Chest Xray shows cardiomegaly and prominent interstitial infiltrates. IVC is dilated on bedside US. EKG shows sinus tachycardia with PVCs and ST abnormalities. AICD interoogation showed ventricular tachycardia during his shocks. Patient is admitted to the CCU for cardiogenic shock from acute decompensated HF. Patient was started on loaded and started on amiodarone gtt. 1x 60mg IV lasix was given and transitioned to bumex gtt. 1x cefepime and vancomycin were given, blood cultures x2 collected. With bipap, dyspnea improved. Levophed started to maintain MAP>65. heparin gtt initially started for anticoagulation, but will be held for procedure to do invasive monitoring.      (25 Dec 2022 22:13)      Electrophysiology:  82y Male with h/o HTN, HLD, CKD, CAD, MI 5yrs ago, no PCI, severe AS JOSELIN 0.8cm,  HFrEF, s/p single chamber AICD ([place]), persistent AF/AFL on Eliquis. unsuccessful  DCCV , started on Tikosyn, was unable to tolerate due to QT prolongation, Amio loaded instead, converted to NSR. Subsequently reverted to AFL.  Patient reports shortness of breath with exertion and chest comfort for several weeks  Admitted after ICD shocks x5. On interrogation and telemetry while inhospital, PVC triggered VT, treated with ATP and shocks, short periods of NSR post shock and reverting to AFL with aberrancy   Now in cardiogenic shock, requiring pressors, acute decompensated HF, pulmonary edema, respiratory failure on BiPAP     REVIEW OF SYSTEMS    [x] A ten-point review of systems was otherwise negative except as noted.  [ ] Due to altered mental status/intubation, subjective information were not able to be obtained from the patient. History was obtained, to the extent possible, from review of the chart and collateral sources of information.      PAST MEDICAL & SURGICAL HISTORY:  CAD (coronary artery disease)      Gout      Paroxysmal atrial fibrillation      Atrial flutter      Chronic systolic congestive heart failure      AICD (automatic cardioverter/defibrillator) present      Hyperlipidemia      Aortic stenosis          Home Medications:  allopurinol 100 mg oral tablet: 1 tab(s) orally once a day (25 Dec 2022 22:38)  amiodarone 200 mg oral tablet: 1 tab(s) orally once a day (25 Dec 2022 22:38)  Eliquis 2.5 mg oral tablet: 1 tab(s) orally 2 times a day (25 Dec 2022 22:38)  metoprolol succinate 50 mg oral tablet, extended release: 1 tab(s) orally once a day (25 Dec 2022 22:38)  omeprazole 20 mg oral delayed release capsule: 1 cap(s) orally once a day (25 Dec 2022 22:38)  Praluent Pen 75 mg/mL subcutaneous solution: subcutaneous every 2 weeks (25 Dec 2022 22:38)  tamsulosin 0.4 mg oral capsule: 1 cap(s) orally once a day (25 Dec 2022 22:38)      Allergies:  No Known Allergies  statins (Muscle Pain)      FAMILY HISTORY: NC      SOCIAL HISTORY: denies tobacco / ETOH / illicit drug use     CIGARETTES:  ALCOHOL:  MARIJUANA:  ILLICIT DRUGS:        PREVIOUS DIAGNOSTIC TESTING:      ECHO  FINDINGS:  < from: TTE Echo Complete w/ Contrast w/ Doppler (22 @ 08:51) >  Summary:   1. Left ventricular ejection fraction, by visual estimation, is 25 to   30%.   2. Severely decreased global left ventricular systolic function.   3. Global cardiomyopathy.   4. The left ventricular diastolic function could not be assessed in this   study.   5. Mildly enlarged left atrium.   6. Normal right atrial size.   7. Mild thickening of the anterior and posterior mitral valve leaflets.   8. Mild to moderate mitral valve regurgitation.   9. Mild tricuspid regurgitation.  10. Aortic valve thickening with decreased leaflet opening.  11. Mild aortic regurgitation.  12. Severe aortic stenosis, peak/mean PG 58/36 mmHg, JOSELIN 0.7 cm^2.    < end of copied text >    STRESS  FINDINGS:    CATHETERIZATION  FINDINGS:    ELECTROPHYSIOLOGY STUDY  FINDINGS:    CAROTID ULTRASOUND:  FINDINGS    VENOUS DUPLEX SCAN:  FINDINGS:    CHEST CT PULMONARY ANGIO with IV Contrast:  FINDINGS:      MEDICATIONS  (STANDING):  aMIOdarone Infusion 1 mG/Min (33.3 mL/Hr) IV Continuous <Continuous>  aMIOdarone Infusion 0.5 mG/Min (16.7 mL/Hr) IV Continuous <Continuous>  ceFAZolin   IVPB 1000 milliGRAM(s) IV Intermittent every 8 hours  chlorhexidine 2% Cloths 1 Application(s) Topical <User Schedule>  digoxin  Injectable 250 MICROGram(s) IV Push every 8 hours  heparin  Infusion.  Unit(s)/Hr (23 mL/Hr) IV Continuous <Continuous>  norepinephrine Infusion 0.057 MICROgram(s)/kG/Min (12.1 mL/Hr) IV Continuous <Continuous>  pantoprazole    Tablet 40 milliGRAM(s) Oral before breakfast  tamsulosin 0.4 milliGRAM(s) Oral at bedtime    MEDICATIONS  (PRN):  heparin   Injectable 30495 Unit(s) IV Push every 6 hours PRN For aPTT less than 40  heparin   Injectable 5000 Unit(s) IV Push every 6 hours PRN For aPTT between 40 - 57      Vital Signs Last 24 Hrs  T(C): 37.9 (26 Dec 2022 12:00), Max: 38.8 (25 Dec 2022 18:04)  T(F): 100.2 (26 Dec 2022 12:00), Max: 101.9 (25 Dec 2022 18:04)  HR: 108 (26 Dec 2022 13:00) (97 - 121)  BP: 146/60 (26 Dec 2022 08:11) (88/54 - 146/60)  BP(mean): 86 (26 Dec 2022 08:11) (61 - 92)  RR: 23 (26 Dec 2022 13:00) (18 - 37)  SpO2: 96% (26 Dec 2022 13:00) (93% - 100%)    Parameters below as of 26 Dec 2022 13:00  Patient On (Oxygen Delivery Method): nasal cannula  O2 Flow (L/min): 3      PHYSICAL EXAM:    GENERAL: In no apparent distress, well nourished, and hydrated.  HEAD:  Atraumatic, Normocephalic  EYES: EOMI, PERRLA, conjunctiva and sclera clear  NECK: Supple and normal thyroid.  No JVD or carotid bruit.  Carotid pulse is 2+ bilaterally.  HEART: Regular rate and rhythm; No murmurs, rubs, or gallops.  PULMONARY: Clear to auscultation and perfusion.  No rales, wheezing, or rhonchi bilaterally.  ABDOMEN: Soft, Nontender, Nondistended; Bowel sounds present  EXTREMITIES:  2+ Peripheral Pulses, No clubbing, cyanosis, or edema  NEUROLOGICAL: Grossly nonfocal    I&O's Detail    25 Dec 2022 07:01  -  26 Dec 2022 07:00  --------------------------------------------------------  IN:    Amiodarone: 66.6 mL    Amiodarone: 100.2 mL    Bumetanide: 40 mL    DOBUTamine: 51 mL    Heparin Infusion: 80 mL    IV PiggyBack: 50 mL  Total IN: 387.8 mL    OUT:    Indwelling Catheter - Urethral (mL): 1325 mL  Total OUT: 1325 mL    Total NET: -937.2 mL      26 Dec 2022 07:01  -  26 Dec 2022 13:15  --------------------------------------------------------  IN:    Amiodarone: 83.5 mL    Heparin Infusion: 95 mL    Oral Fluid: 30 mL  Total IN: 208.5 mL    OUT:    Indwelling Catheter - Urethral (mL): 990 mL  Total OUT: 990 mL    Total NET: -781.5 mL        Daily Height in cm: 180.34 (25 Dec 2022 23:00)    Daily     INTERPRETATION OF TELEMETRY:    EC Lead ECG:   Ventricular Rate 123 BPM    Atrial Rate 123 BPM    P-R Interval 200 ms    QRS Duration 124 ms    Q-T Interval 324 ms    QTC Calculation(Bazett) 463 ms    P Axis 72 degrees    R Axis 93 degrees    T Axis -49 degrees    Diagnosis Line Sinus tachycardia with occasional Premature ventricular complexes  Possible Left atrial enlargement  Rightward axis  Possible Anterior infarct , age undetermined  ST & T wave abnormality, consider inferolateral ischemia  Abnormal ECG  Confirmed by MARCEL SHEPPARD, NATALIIA([id number]) on 2022 10:13:09 PM (22 @ 16:56)        LABS:                        8.9    16.79 )-----------( 258      ( 26 Dec 2022 09:43 )             29.3         140  |  100  |  58<H>  ----------------------------<  150<H>  4.8   |  17  |  2.6<H>    Ca    8.8      26 Dec 2022 03:30  Mg     2.6         TPro  6.2  /  Alb  3.7  /  TBili  0.4  /  DBili  x   /  AST  59<H>  /  ALT  17  /  AlkPhos  47      CARDIAC MARKERS ( 26 Dec 2022 12:00 )  x     / 0.99 ng/mL / x     / x     / x      CARDIAC MARKERS ( 26 Dec 2022 03:30 )  x     / 0.91 ng/mL / x     / x     / x      CARDIAC MARKERS ( 25 Dec 2022 17:25 )  x     / 0.15 ng/mL / x     / x     / x          PT/INR - ( 25 Dec 2022 17:25 )   PT: 17.80 sec;   INR: 1.54 ratio         PTT - ( 26 Dec 2022 12:00 )  PTT:55.8 sec  Urinalysis Basic - ( 26 Dec 2022 03:00 )    Color: Light Yellow / Appearance: Clear / S.012 / pH: x  Gluc: x / Ketone: Negative  / Bili: Negative / Urobili: <2 mg/dL   Blood: x / Protein: Negative / Nitrite: Negative   Leuk Esterase: Negative / RBC: x / WBC x   Sq Epi: x / Non Sq Epi: x / Bacteria: x      BNPSerum Pro-Brain Natriuretic Peptide: 17571 pg/mL ( @ 17:25)        COVID-19 PCR: NotDetec (22 @ 18:36)        RADIOLOGY & ADDITIONAL STUDIES:

## 2022-12-26 NOTE — PROGRESS NOTE ADULT - ASSESSMENT
81yo M hx of CAD (prev hx of MI), HFrEF s/p AICD, severe AS, Afib/Aflutter on eliquis, CKD (baseline ~ 2.0), HLD, gout presenting to the ED for AICD discharge x 5. Patient has been experiencing shortness of breath with exertion and chest comfort for several weeks. Today, patient was going to bathroom when he started feeling lightheaded, and short of breath. Patient felt he was becoming disoriented, anxious, and had AICD shock him 5 times, prompting family to call ED. Patient had [place] Dony catheter placement on 12/26.    #Cardiogenic shock  #Acute decompensated heart failure  #Acute hypoxemic respiratory failure 2/2 pulmonary edema  #NSTEMI  #CKD  #Lactic acidosis  #AICD shock 2/2 ventricualar tachycardia  #CAD  #HLD  #BPH  #Leukocytosis  #Elbow erythema vs cellulitis?      PLAN:    CNS: avoid sedation    HEENT:  Oral care    PULMONARY:  HOB @ 45 degrees, Patient off BiPAP 12/26 AM, now on 3-4L NC. keep SaO2 92-96%, f/u AM Chest Xray    CARDIOVASCULAR:    - Patient follows with cardiologist Dr. Dwyer at [facility].   - [place]-dony placed, currently volume overloaded.   - Was on bumex gtt, discontinued 12/26.  Avoid over-diuresing given AS.  - TTE 12/26 showed EF 25-30%, severe aortic stenosis.  - A-line placed. maintain MAP 65-70, Discontinued levophed 12/26. Held dobutamine due to run of v tach overnight.   - cont amio gtt as per protocol. digoxin added to control HR 2/2 atrial fibrillation with RVR (450 x1, followed by 250 q8H x 2). Stopped digoxin due to kidney function. Avoid BBs, CCBs, ACEi  - trend troponin. check AM EKG  - cont telemetry monitoring  - EP following, recommended adding mexilitine 150 mg q8hrs due to persistent A. fib. F/u recommendations. Possible Cath tomorrow, NPO after midnight.   - AICD interrogation needed.     GI: cont PPI, dash/tlc diet with fluid restriction    RENAL:  F/u  lytes.  maintain K>4.0, Mg>2.0,. accurate I/O, bueno inserted to monitor urine output    INFECTIOUS DISEASE: s/p cefepime and vancomycin. monitor leukocytosis. check MRSA. f/u Blood Cx, UCx, UA, procal. Stopped cefazolin. Started course of vancomycin and cefepime for broad spectrum coverage.   - lactate improved    HEMATOLOGICAL:  start heparin gtt. monitor PTT 60-90. had a previous admission at outside hospital for suspected GIB from NSAID use    ENDOCRINE:  Follow up FS.  Insulin protocol if needed. Hold allopurinol for now    MUSCULOSKELETAL: bedrest.     CODE STATUS: FULL CODE. Discussed with patient about GOC. Patient wants full code    DISPOSITION: Pt requires continued monitoring in the CCU    NOTE: EMERGENCY CONTACT IS THE DAUGHTER- TATYANA JACKSON- NOT THE WIFE.  - CAN CHECK PREVIOUS RECORDS FROM OUTSIDE HOSPITAL ON HIS LABTOP IF NEEDED

## 2022-12-26 NOTE — PROGRESS NOTE ADULT - SUBJECTIVE AND OBJECTIVE BOX
Patient is a 82y old  Male who presents with a chief complaint of acute decompesated heart failure (26 Dec 2022 13:14)      INTERVAL HPI/OVERNIGHT EVENTS:   Patient s/p radial a-line and McDonald-Dony catheter placement overnight. Patient had a run of v tach, was shocked. Dobutamine gtt held. Currently still in A. fib.   Transitioned from BiPAP to nasal cannula.   Lowgrade fever.    ICU Vital Signs Last 24 Hrs  T(C): 37.9 (26 Dec 2022 12:00), Max: 38.8 (25 Dec 2022 18:04)  T(F): 100.2 (26 Dec 2022 12:00), Max: 101.9 (25 Dec 2022 18:04)  HR: 104 (26 Dec 2022 14:00) (97 - 121)  BP: 146/60 (26 Dec 2022 08:11) (88/54 - 146/60)  BP(mean): 86 (26 Dec 2022 08:11) (61 - 92)  ABP: 93/58 (26 Dec 2022 14:00) (76/54 - 214/68)  ABP(mean): 72 (26 Dec 2022 14:00) (59 - 147)  RR: 22 (26 Dec 2022 14:00) (18 - 37)  SpO2: 97% (26 Dec 2022 14:00) (93% - 100%)    O2 Parameters below as of 26 Dec 2022 14:00  Patient On (Oxygen Delivery Method): nasal cannula  O2 Flow (L/min): 3        I&O's Summary    25 Dec 2022 07:  -  26 Dec 2022 07:00  --------------------------------------------------------  IN: 387.8 mL / OUT: 1325 mL / NET: -937.2 mL    26 Dec 2022 07:01  -  26 Dec 2022 14:48  --------------------------------------------------------  IN: 283.9 mL / OUT: 1190 mL / NET: -906.1 mL          LABS:                        8.9    16.79 )-----------( 258      ( 26 Dec 2022 09:43 )             29.3         140  |  100  |  58<H>  ----------------------------<  150<H>  4.8   |  17  |  2.6<H>    Ca    8.8      26 Dec 2022 03:30  Mg     2.6         TPro  6.2  /  Alb  3.7  /  TBili  0.4  /  DBili  x   /  AST  59<H>  /  ALT  17  /  AlkPhos  47      PT/INR - ( 25 Dec 2022 17:25 )   PT: 17.80 sec;   INR: 1.54 ratio         PTT - ( 26 Dec 2022 12:00 )  PTT:55.8 sec  Urinalysis Basic - ( 26 Dec 2022 03:00 )    Color: Light Yellow / Appearance: Clear / S.012 / pH: x  Gluc: x / Ketone: Negative  / Bili: Negative / Urobili: <2 mg/dL   Blood: x / Protein: Negative / Nitrite: Negative   Leuk Esterase: Negative / RBC: x / WBC x   Sq Epi: x / Non Sq Epi: x / Bacteria: x      CAPILLARY BLOOD GLUCOSE      POCT Blood Glucose.: 225 mg/dL (25 Dec 2022 22:40)    ABG - ( 26 Dec 2022 05:30 )  pH, Arterial: 7.44  pH, Blood: x     /  pCO2: 38    /  pO2: 81    / HCO3: 26    / Base Excess: 1.6   /  SaO2: 97.5                RADIOLOGY & ADDITIONAL TESTS:    Consultant(s) Notes Reviewed:  [x ] YES  [ ] NO    MEDICATIONS  (STANDING):  aMIOdarone Infusion 1 mG/Min (33.3 mL/Hr) IV Continuous <Continuous>  aMIOdarone Infusion 0.5 mG/Min (16.7 mL/Hr) IV Continuous <Continuous>  ceFAZolin   IVPB 1000 milliGRAM(s) IV Intermittent every 8 hours  chlorhexidine 2% Cloths 1 Application(s) Topical <User Schedule>  heparin  Infusion.  Unit(s)/Hr (23 mL/Hr) IV Continuous <Continuous>  mexiletine 150 milliGRAM(s) Oral every 8 hours  norepinephrine Infusion 0.057 MICROgram(s)/kG/Min (12.1 mL/Hr) IV Continuous <Continuous>  pantoprazole    Tablet 40 milliGRAM(s) Oral before breakfast  tamsulosin 0.4 milliGRAM(s) Oral at bedtime    MEDICATIONS  (PRN):  heparin   Injectable 60519 Unit(s) IV Push every 6 hours PRN For aPTT less than 40  heparin   Injectable 5000 Unit(s) IV Push every 6 hours PRN For aPTT between 40 - 57      PHYSICAL EXAM:  GENERAL: ill-appearing male, in no acute distress. Right IJ McDonald Dony in place.   HEAD:  Atraumatic, Normocephalic  EYES: EOMI, PERRLA, conjunctiva and sclera clear  NECK: Supple, No JVD, Normal thyroid, no enlarged nodes  NERVOUS SYSTEM:  Alert & Awake.   CHEST/LUNG: B/L good air entry; No rales, rhonchi, or wheezing  HEART: S1S2 normal, no S3, Regular rate and rhythm; High pitched, crescendo-decrescendo mid systolic ejection murmur present  ABDOMEN: Soft, Nontender, Nondistended; Bowel sounds present  EXTREMITIES:  2+ Peripheral Pulses, No clubbing, cyanosis. Trace edema  LYMPH: No lymphadenopathy noted  SKIN: No rashes or lesions    Care Discussed with Consultants/Other Providers [ x] YES  [ ] NO

## 2022-12-26 NOTE — CONSULT NOTE ADULT - ASSESSMENT
Cards: Dr Yuliya Mukherjee    82y Male with h/o HTN, HLD, CKD, CAD, MI 5yrs ago, no PCI, severe AS JOSELIN 0.8cm,  HFrEF, s/p single chamber AICD ([place]), persistent AF/AFL on Eliquis.  Admitted after ICD shocks x5.  now in cardiogenic shock, acute on chronic HF, pulmonary edema, respiratory faily.  SG cath in place      VT  SC ICD ([place])  frequent PVC  AFL  acute on chronic HF exacerbation  cardiogenic shock   ICMP  CAD,   HTN  CKD      con't tele  Echocardiogram, TAVR consult  uptitrate beta blockers as BP tolerates  con't Amio load, once complete IV change to 400mg daily  add Mexillitine 150mg bid  Maintain electrolytes K>4.0 Mg >2.0 check frequently  daily EKG  C for ischemic eval  will follow

## 2022-12-26 NOTE — CHART NOTE - NSCHARTNOTEFT_GEN_A_CORE
12/26 2am    On levophed 0.5, bumex, amio infusion     pH 7.4 Lactate 1.4 (down from 3.7)  MAP: 71  CO 3.7  CI 1.5  CVP 15  SVR 1297  TRENT 2.6    c/w amio, levophed. Start Bumex 2; hold bumex once CVP close to 10; Start dobutamine 5; wean off levophed aggressively 12/26 2am    On levophed 0.5, bumex, amio infusion     Creatinine 2.8 (at baseline)  pH 7.4 Lactate 1.4 (down from 3.7)  MAP: 71  CO 3.7  CI 1.5  CVP 15  SVR 1297  TRENT 2.6    - c/w amio, levophed. Start Bumex 2; hold bumex once CVP close to 10; Start dobutamine 5; wean off levophed aggressively  - For tachycardia, will load with 450 Digoxin and 250 q8 for 2 doses. Holding beta blockers d/t HF. 12/26 2am    On levophed 0.5, bumex, amio infusion     Creatinine 2.8 (at baseline)  pH 7.4 Lactate 1.4 (down from 3.7)  MAP: 71  CO 3.7  CI 1.5  CVP 15  SVR 1297  TRENT 2.6    - c/w amio, levophed. Start Bumex 2; hold bumex once CVP close to 10; Start dobutamine 5; wean off levophed aggressively  - For tachycardia, will load with 450 Digoxin and 250 q8 for 2 doses. Holding beta blockers d/t HF.    4am  - patient developed 27s NSVT followed by AICD firing @ 4:13. doubtamine held. 12/26 2am    On levophed 0.5, bumex, amio infusion     Creatinine 2.8 (at baseline)  pH 7.4 Lactate 1.4 (down from 3.7)  MAP: 71  CO 3.7  CI 1.5  CVP 15  SVR 1297  TRENT 2.6    - c/w amio, levophed. Start Bumex 2; hold bumex once CVP close to 10; Start dobutamine 5; wean off levophed aggressively  - For tachycardia, will load with 450 Digoxin and 250 q8 for 2 doses. Holding beta blockers d/t HF.    4am  - patient developed 27s NSVT followed by AICD firing @ 4:13. doubtamine held. 100mg lidocaine IVP x1 ordered. 12/26 2am    On levophed 0.5, bumex, amio infusion     Creatinine 2.8 (at baseline)  pH 7.4 Lactate 1.4 (down from 3.7)  MAP: 71  CO 3.7  CI 1.5  CVP 15  SVR 1297  TRENT 2.6    - c/w amio, levophed. Start Bumex 2; hold bumex once CVP close to 10; Start dobutamine 5; wean off levophed aggressively  - For tachycardia, will load with 450 Digoxin and 250 q8 for 2 doses. Holding beta blockers d/t HF.    4am  - patient developed 27s NSVT followed by AICD firing @ 4:13. dobutamine d/c-ed. 100mg lidocaine IVP x1 ordered. 12/26 2am    On levophed 0.5, bumex, amio infusion     Creatinine 2.8 (at baseline)  pH 7.4 Lactate 1.4 (down from 3.7)  MAP: 71  CO 3.7  CI 1.5  CVP 15  SVR 1297  TRENT 2.6    - c/w amio, levophed. Start Bumex 2; hold bumex once CVP close to 10; Start dobutamine 5; wean off levophed aggressively  - For tachycardia, will load with 450 Digoxin and 250 q8 for 2 doses. Holding beta blockers d/t HF.    4am  - patient developed 27s NSVT followed by AICD firing @ 4:13. dobutamine d/c-ed. 100mg lidocaine IVP x1 ordered. CVP- 8. bumex discontinued 12/26 2am    On levophed 0.5, bumex, amio infusion     Creatinine 2.8 (at baseline)  pH 7.4 Lactate 1.4 (down from 3.7)  MAP: 71  CO 3.7  CI 1.5  CVP 15  SVR 1297  TRENT 2.6    - c/w amio, levophed. Start Bumex 2; hold bumex once CVP close to 10; Start dobutamine 5; wean off levophed aggressively  - For tachycardia, will load with 450 Digoxin and 250 q8 for 2 doses. Holding beta blockers d/t HF.    4am  - patient developed 27s NSVT followed by AICD firing @ 4:13. dobutamine d/c-ed. 100mg lidocaine IVP x1 given. CVP- 8. bumex discontinued. UOP 800cc total 12/26 2am    On levophed 0.5, bumex, amio infusion     Creatinine 2.8 (at baseline)  pH 7.4 Lactate 1.4 (down from 3.7)  MAP: 71  CO 3.7  CI 1.5  CVP 15  SVR 1297  TRENT 2.6    - c/w amio, levophed. Start Bumex 2; hold bumex once CVP close to 10; Start dobutamine 5; wean off levophed aggressively  - For tachycardia, will load with 450 Digoxin and 250 q8 for 2 doses. Holding beta blockers d/t HF.    4am  - patient developed 27s NSVT followed by AICD firing @ 4:13. dobutamine d/c-ed. 100mg lidocaine IVP x1 given. CVP- 8. bumex discontinued. UOP 800cc total  - lab notified team of PTT > 200s. heparin gtt held 12/26 2am    On levophed 0.5, bumex, amio infusion     Creatinine 2.8 (at baseline)  pH 7.4 Lactate 1.4 (down from 3.7)  MAP: 71  CO 3.7  CI 1.5  CVP 15  SVR 1297  TRENT 2.6    - c/w amio, levophed. Start Bumex 2; hold bumex once CVP close to 10; Start dobutamine 5; wean off levophed aggressively  - For tachycardia, will load with 450 Digoxin and 250 q8 for 2 doses. Holding beta blockers d/t HF.    4am  - patient developed 27s NSVT followed by AICD firing @ 4:13. dobutamine d/c-ed. 100mg lidocaine IVP x1 given. CVP- 8. bumex discontinued. UOP 800cc total  - lab notified team of PTT > 200s. heparin gtt held as per protocol 12/26 2am    On levophed 0.5, bumex, amio infusion     Creatinine 2.8 (at baseline)  pH 7.4 Lactate 1.4 (down from 3.7)  MAP: 71  CO 3.7  CI 1.5  CVP 15  SVR 1297  TRENT 2.6    - c/w amio, levophed. Start Bumex 2; hold bumex once CVP close to 10; Start dobutamine 5; wean off levophed aggressively  - For tachycardia, will load with 450 Digoxin and 250 q8 for 2 doses. Holding beta blockers d/t HF.    4am  - patient developed 27s NSVT followed by AICD firing @ 4:13. dobutamine d/c-ed. 100mg lidocaine IVP x1 given. CVP- 8. bumex discontinued. UOP 800cc total  - lab notified team of PTT > 200s. heparin gtt held as per protocol    6:30am  pH 7.39 Lactate 1  MAP 68  CO: 4.3  CI: 1.8  CVP 5  SVR 1172  TRENT 5   0.64    Phenylephrine and Milrinone? 12/26 2am    On levophed 0.5, bumex, amio infusion     Creatinine 2.8 (at baseline)  pH 7.4 Lactate 1.4 (down from 3.7)  MAP: 71  CO 3.7  CI 1.5  CVP 15  SVR 1297  TRENT 2.6    - c/w amio, levophed. Start Bumex 2; hold bumex once CVP close to 10; Start dobutamine 5; wean off levophed aggressively  - For tachycardia, will load with 450 Digoxin and 250 q8 for 2 doses. Holding beta blockers d/t HF.    4am  - patient developed 27s NSVT followed by AICD firing @ 4:13. dobutamine d/c-ed. 100mg lidocaine IVP x1 given. CVP- 8. bumex discontinued. UOP 800cc total  - lab notified team of PTT > 200s. heparin gtt held as per protocol    6:30am  pH 7.39 Lactate 1  MAP 68  CO: 4.3  CI: 1.8  CVP 5  SVR 1172  TRENT 5   0.64    Start Dobutamine 2.5. 12/26 2am    On levophed 0.5, bumex, amio infusion     Creatinine 2.8 (at baseline)  pH 7.4 Lactate 1.4 (down from 3.7)  MAP: 71  CO 3.7  CI 1.5  CVP 15  SVR 1297  TRENT 2.6    - c/w amio, levophed. Start Bumex 2; hold bumex once CVP close to 10; Start dobutamine 5; wean off levophed aggressively  - For tachycardia, will load with 450 Digoxin and 250 q8 for 2 doses. Holding beta blockers d/t HF.    4am  - patient developed 27s NSVT followed by AICD firing @ 4:13.   - dobutamine d/c-ed. 100mg lidocaine IVP x1 given.   - CVP- 8. bumex discontinued. UOP 800cc total  - lab notified team of PTT > 200s. heparin gtt held as per protocol    6:30am  pH 7.39 Lactate 1  MAP 68  CO: 4.3  CI: 1.8  CVP 5  SVR 1172  TRENT 5   0.64

## 2022-12-27 NOTE — CONSULT NOTE ADULT - SUBJECTIVE AND OBJECTIVE BOX
Patient is a 82y old  Male who presents with a chief complaint of acute decompesated heart failure (27 Dec 2022 07:59)      HPI:  83yo M hx of CAD (prev hx of MI), HFrEF s/p AICD, severe AS, Afib/Aflutter on eliquis, CKD (baseline ~ 2.0), HLD, gout presenting to the ED for AICD discharge x 5. Patient has been experiencing shortness of breath with exertion and chest comfort for several weeks. Today, patient was going to bathroom when he started feeling lightheaded, and short of breath. Patient felt he was becoming disoriented, anxious, and had AICD shock him 5 times, prompting family to call ED. Otherwise, patient denies other complaints including fever, chest pain, palpitations, wheezing, abdominal pain, nausea/vomiting, melena, dysuria. Patient does endorse joint pain, joint swelling- in the right elbow. He initially went to outside hospital for evaluation of his right elbow because "it swelled up like a balloon" that was observed by his PT therapist. During their workup, no infection was found. Patient did have a low hemoglobin prompting a prbc t ransfusion and an EGD/colonoscopy (etiology presumed to be from NSAID use due to neck pain). Hb improved and patient was discharged, but shortness of breath nor chest discomfort was addressed at that time.     At the ED, T- 97.8F, BP: 108/67, HR: 121bpm, RR: 20bpm, SaO2: 97% on RA. Patient was feeling more dyspneic, and O2 saturation decreased to high 80s-> prompting requiring bipap support. Labs significant for elevated WBC- 22.3K, Hb- 9.6, creatinine- 2.3,  troponin- 0.15, BNP- 25K. On VBG- lactate- 3.7. Chest Xray shows cardiomegaly and prominent interstitial infiltrates. IVC is dilated on bedside US. EKG shows sinus tachycardia with PVCs and ST abnormalities. AICD interoogation showed ventricular tachycardia during his shocks. Patient is admitted to the CCU for cardiogenic shock from acute decompensated HF. Patient was started on loaded and started on amiodarone gtt. 1x 60mg IV lasix was given and transitioned to bumex gtt. 1x cefepime and vancomycin were given, blood cultures x2 collected. With bipap, dyspnea improved. Levophed started to maintain MAP>65. heparin gtt initially started for anticoagulation, but will be held for procedure to do invasive monitoring.      (25 Dec 2022 22:13)    Overnight, the patient maxed out on multiple antiarrhythmic drips with recurrent resistant VT.  He was shocked several times.  He was intubated for sympathetic suppression with sedation.      PAST MEDICAL & SURGICAL HISTORY:  CAD (coronary artery disease)      Gout      Paroxysmal atrial fibrillation      Atrial flutter      Chronic systolic congestive heart failure      AICD (automatic cardioverter/defibrillator) present      Hyperlipidemia      Aortic stenosis          SOCIAL HX:   Smoking                         ETOH                            Other    FAMILY HISTORY:  :  No known cardiovacular family hisotry     Review Of Systems:     All ROS are negative except per HPI       Allergies    No Known Allergies    Intolerances    statins (Muscle Pain)        PHYSICAL EXAM    ICU Vital Signs Last 24 Hrs  T(C): 36.8 (27 Dec 2022 12:07), Max: 37.6 (26 Dec 2022 16:00)  T(F): 98.2 (27 Dec 2022 12:07), Max: 99.7 (26 Dec 2022 16:00)  HR: 67 (27 Dec 2022 13:00) (67 - 140)  BP: 87/51 (27 Dec 2022 12:07) (87/51 - 87/51)  BP(mean): 66 (27 Dec 2022 12:07) (66 - 66)  ABP: 90/53 (27 Dec 2022 13:00) (70/41 - 111/59)  ABP(mean): 66 (27 Dec 2022 13:00) (55 - 81)  RR: 18 (27 Dec 2022 13:00) (14 - 33)  SpO2: 93% (27 Dec 2022 13:00) (90% - 100%)    O2 Parameters below as of 27 Dec 2022 12:07  Patient On (Oxygen Delivery Method): ventilator    O2 Concentration (%): 60        Constitutional: no acute distress, well nourished well developed  Neuro: moving all 4 limbs spontaneously.  Sedated.  HEENT: NCAT, anicteric, ETT in place  Neck: no visible lymphadenopathy or goiter  Pulm: synchronous with ventilator, coarse bilateral breath sounds anteriorly  Cardiac: extremities appear pink and well-perfused.  regular rhythm and rate, no murmur detected  Abdomen: non-distended  Extremities: trace dependent edema  Skin: no visible rashes or lesions          12-26-22 @ 07:01  -  22 @ 07:00  --------------------------------------------------------  IN:    Amiodarone: 334 mL    Amiodarone: 133.2 mL    Heparin Infusion: 494 mL    IV PiggyBack: 650 mL    Lidocaine: 52.5 mL    Lidocaine: 15 mL    Oral Fluid: 130 mL    Phenylephrine: 190 mL  Total IN: 1998.7 mL    OUT:    Indwelling Catheter - Urethral (mL): 2950 mL  Total OUT: 2950 mL    Total NET: -951.3 mL      22 @ 07:01  -  22 @ 13:55  --------------------------------------------------------  IN:    Amiodarone: 233.1 mL    FentaNYL: 196.7 mL    Heparin Infusion: 147 mL    IV PiggyBack: 200 mL    Lidocaine: 105 mL    Midazolam: 42 mL    Norepinephrine: 124.1 mL    Phenylephrine: 43 mL    Phenylephrine: 157.6 mL    Vasopressin: 18 mL  Total IN: 1266.5 mL    OUT:    Indwelling Catheter - Urethral (mL): 75 mL  Total OUT: 75 mL    Total NET: 1191.5 mL          LABS:                          8.6    13.71 )-----------( 264      ( 27 Dec 2022 09:30 )             28.3                                                   130<L>  |  94<L>  |  60<H>  ----------------------------<  222<H>  4.8   |  24  |  2.5<H>    Ca    8.2<L>      27 Dec 2022 09:30  Mg     2.2         TPro  5.8<L>  /  Alb  3.4<L>  /  TBili  0.5  /  DBili  x   /  AST  46<H>  /  ALT  22  /  AlkPhos  34        PT/INR - ( 25 Dec 2022 17:25 )   PT: 17.80 sec;   INR: 1.54 ratio         PTT - ( 27 Dec 2022 04:23 )  PTT:62.1 sec                                       Urinalysis Basic - ( 26 Dec 2022 03:00 )    Color: Light Yellow / Appearance: Clear / S.012 / pH: x  Gluc: x / Ketone: Negative  / Bili: Negative / Urobili: <2 mg/dL   Blood: x / Protein: Negative / Nitrite: Negative   Leuk Esterase: Negative / RBC: x / WBC x   Sq Epi: x / Non Sq Epi: x / Bacteria: x        CARDIAC MARKERS ( 26 Dec 2022 12:00 )  x     / 0.99 ng/mL / x     / x     / x      CARDIAC MARKERS ( 26 Dec 2022 03:30 )  x     / 0.91 ng/mL / x     / x     / x      CARDIAC MARKERS ( 25 Dec 2022 17:25 )  x     / 0.15 ng/mL / x     / x     / x                                                LIVER FUNCTIONS - ( 27 Dec 2022 09:30 )  Alb: 3.4 g/dL / Pro: 5.8 g/dL / ALK PHOS: 34 U/L / ALT: 22 U/L / AST: 46 U/L / GGT: x                                                  Culture - Urine (collected 26 Dec 2022 07:56)  Source: Clean Catch Clean Catch (Midstream)  Final Report (27 Dec 2022 13:26):    No growth    Culture - Blood (collected 25 Dec 2022 18:20)  Source: .Blood Blood  Preliminary Report (27 Dec 2022 07:02):    No growth to date.    Culture - Blood (collected 25 Dec 2022 18:20)  Source: .Blood Blood  Preliminary Report (27 Dec 2022 07:02):    No growth to date.                                                   Mode: AC/ CMV (Assist Control/ Continuous Mandatory Ventilation)  RR (machine): 18  TV (machine): 450  FiO2: 60  PEEP: 8  ITime: 0.8  MAP: 12  PIP: 25                                      ABG - ( 27 Dec 2022 08:58 )  pH, Arterial: 7.30  pH, Blood: x     /  pCO2: 49    /  pO2: 213   / HCO3: 24    / Base Excess: -2.4  /  SaO2: 99.0                X-Rays reviewed                                                                                     ECHO    CXR interpreted by me demonstrates pulmonary edema bilaterally    MEDICATIONS  (STANDING):  aMIOdarone Infusion 1 mG/Min (33.3 mL/Hr) IV Continuous <Continuous>  buMETAnide Injectable 2 milliGRAM(s) IV Push every 8 hours  cefepime   IVPB      cefepime   IVPB 2000 milliGRAM(s) IV Intermittent every 12 hours  chlorhexidine 2% Cloths 1 Application(s) Topical <User Schedule>  fentaNYL   Infusion. 0.5 MICROgram(s)/kG/Hr (5.68 mL/Hr) IV Continuous <Continuous>  heparin  Infusion.  Unit(s)/Hr (23 mL/Hr) IV Continuous <Continuous>  lidocaine   Infusion 2 mG/Min (15 mL/Hr) IV Continuous <Continuous>  metoprolol tartrate 25 milliGRAM(s) Oral two times a day  midazolam Infusion 0.02 mG/kG/Hr (2.27 mL/Hr) IV Continuous <Continuous>  pantoprazole    Tablet 40 milliGRAM(s) Oral before breakfast  phenylephrine    Infusion 0.3 MICROgram(s)/kG/Min (6.38 mL/Hr) IV Continuous <Continuous>  tamsulosin 0.4 milliGRAM(s) Oral at bedtime  vancomycin  IVPB 1000 milliGRAM(s) IV Intermittent every 24 hours  vasopressin Infusion 0.04 Unit(s)/Min (6 mL/Hr) IV Continuous <Continuous>    MEDICATIONS  (PRN):  heparin   Injectable 98884 Unit(s) IV Push every 6 hours PRN For aPTT less than 40  heparin   Injectable 5000 Unit(s) IV Push every 6 hours PRN For aPTT between 40 - 57

## 2022-12-27 NOTE — PROGRESS NOTE ADULT - ATTENDING COMMENTS
Patient seen, case d/w with CCU team, EP attending.  Remote MI, severe LV dysfunction, persistent A-fib, severe AS (followed by cardiology at [facility]).  Several ICD shocks for fast monomorphic VT.  A-fib with RVR.  Cardiogenic shock with acute HF, responded to Dobutamine and Bumex drip.  Still in A-fib, borderline VR.  Continue CCU monitoring, Amiodarone drip. Will add Mexiletine per EP recommendation.  Source of fever unclear, cultures sent, will continue wide-spectrum Abx.  Will need CAD and TAVR evaluation this admission.
1v CAD (RCA )  NICM (severe LV dysfunction) s/p AICD  AF  Severe AS  Chronic Systolic CHF    CKD    Recent GIB s/p EGD (gastritis / presumably secondary to NSAID)  Hb stable in Heparin    Hospitalized with VT storm.  Stabilized with antiarrhythmic Rx and Intubation.  Decompensated CHF (volume overload / hypoperfusion with poor CO and UO)    Recovery unlikely in absence of AV intervention.  CTA reviewed.  Anatomy favorable for BAV.  Plan for high risk BAV +/- Impella +/- PCI tonight.  Discussed with patient's wife, son, and outpatient cardiologist over phone.  Daughter bedside this morning.

## 2022-12-27 NOTE — PROGRESS NOTE ADULT - SUBJECTIVE AND OBJECTIVE BOX
Patient is a 82y old  Male who presents with a chief complaint of acute decompesated heart failure (26 Dec 2022 14:46)      INTERVAL HPI/OVERNIGHT EVENTS:   No overnight events   Afebrile, hemodynamically stable     ICU Vital Signs Last 24 Hrs  T(C): 37.2 (27 Dec 2022 04:00), Max: 37.9 (26 Dec 2022 12:00)  T(F): 99 (27 Dec 2022 04:00), Max: 100.2 (26 Dec 2022 12:00)  HR: 73 (27 Dec 2022 07:30) (73 - 140)  BP: 146/60 (26 Dec 2022 08:11) (146/60 - 146/60)  BP(mean): 86 (26 Dec 2022 08:11) (86 - 86)  ABP: 111/59 (27 Dec 2022 07:00) (70/41 - 111/59)  ABP(mean): 76 (27 Dec 2022 07:00) (55 - 77)  RR: 22 (27 Dec 2022 07:00) (18 - 32)  SpO2: 100% (27 Dec 2022 07:30) (93% - 100%)    O2 Parameters below as of 26 Dec 2022 20:00  Patient On (Oxygen Delivery Method): nasal cannula  O2 Flow (L/min): 3        I&O's Summary    26 Dec 2022 07:01  -  27 Dec 2022 07:00  --------------------------------------------------------  IN: 1929.4 mL / OUT: 2950 mL / NET: -1020.6 mL      Mode: AC/ CMV (Assist Control/ Continuous Mandatory Ventilation)  RR (machine): 14  TV (machine): 450  FiO2: 100  PEEP: 8  ITime: 0.8  MAP: 12  PIP: 25      LABS:                        8.1    11.87 )-----------( 243      ( 27 Dec 2022 04:29 )             27.6     12-27    131<L>  |  95<L>  |  55<H>  ----------------------------<  222<H>  3.5   |  25  |  2.2<H>    Ca    8.0<L>      27 Dec 2022 04:29  Mg     2.4     12-    TPro  5.4<L>  /  Alb  3.1<L>  /  TBili  0.4  /  DBili  x   /  AST  36  /  ALT  16  /  AlkPhos  30  12-    PT/INR - ( 25 Dec 2022 17:25 )   PT: 17.80 sec;   INR: 1.54 ratio         PTT - ( 27 Dec 2022 04:23 )  PTT:62.1 sec  Urinalysis Basic - ( 26 Dec 2022 03:00 )    Color: Light Yellow / Appearance: Clear / S.012 / pH: x  Gluc: x / Ketone: Negative  / Bili: Negative / Urobili: <2 mg/dL   Blood: x / Protein: Negative / Nitrite: Negative   Leuk Esterase: Negative / RBC: x / WBC x   Sq Epi: x / Non Sq Epi: x / Bacteria: x      CAPILLARY BLOOD GLUCOSE      POCT Blood Glucose.: 293 mg/dL (27 Dec 2022 07:36)  POCT Blood Glucose.: 244 mg/dL (27 Dec 2022 05:59)    ABG - ( 27 Dec 2022 04:46 )  pH, Arterial: 7.51  pH, Blood: x     /  pCO2: 32    /  pO2: 130   / HCO3: 26    / Base Excess: 2.6   /  SaO2: 98.0                RADIOLOGY & ADDITIONAL TESTS:    Consultant(s) Notes Reviewed:  [x ] YES  [ ] NO    MEDICATIONS  (STANDING):  aMIOdarone Infusion 1 mG/Min (33.3 mL/Hr) IV Continuous <Continuous>  cefepime   IVPB      cefepime   IVPB 2000 milliGRAM(s) IV Intermittent every 12 hours  chlorhexidine 2% Cloths 1 Application(s) Topical <User Schedule>  fentaNYL    Injectable 100 MICROGram(s) IV Push once  fentaNYL   Infusion... 0.5 MICROgram(s)/kG/Hr (2.84 mL/Hr) IV Continuous <Continuous>  heparin  Infusion.  Unit(s)/Hr (23 mL/Hr) IV Continuous <Continuous>  lidocaine   Infusion 2 mG/Min (15 mL/Hr) IV Continuous <Continuous>  LORazepam   Injectable 2 milliGRAM(s) IV Push once  metoprolol tartrate 25 milliGRAM(s) Oral two times a day  midazolam Infusion 0.02 mG/kG/Hr (2.27 mL/Hr) IV Continuous <Continuous>  norepinephrine Infusion 0.05 MICROgram(s)/kG/Min (10.6 mL/Hr) IV Continuous <Continuous>  pantoprazole    Tablet 40 milliGRAM(s) Oral before breakfast  potassium chloride  20 mEq/100 mL IVPB 20 milliEquivalent(s) IV Intermittent every 2 hours  procainamide   IVPB 1000 milliGRAM(s) IV Intermittent once  tamsulosin 0.4 milliGRAM(s) Oral at bedtime  vancomycin  IVPB 1000 milliGRAM(s) IV Intermittent every 24 hours    MEDICATIONS  (PRN):  heparin   Injectable 39348 Unit(s) IV Push every 6 hours PRN For aPTT less than 40  heparin   Injectable 5000 Unit(s) IV Push every 6 hours PRN For aPTT between 40 - 57      PHYSICAL EXAM:  GENERAL: ill-appearing male, in no acute distress. Right IJ Vina Dony in place.   HEAD:  Atraumatic, Normocephalic  EYES: EOMI, PERRLA, conjunctiva and sclera clear  NECK: Supple, No JVD, Normal thyroid, no enlarged nodes  NERVOUS SYSTEM:  Alert & Awake.   CHEST/LUNG: B/L good air entry; No rales, rhonchi, or wheezing  HEART: S1S2 normal, no S3, Regular rate and rhythm; High pitched, crescendo-decrescendo mid systolic ejection murmur present  ABDOMEN: Soft, Nontender, Nondistended; Bowel sounds present  EXTREMITIES:  2+ Peripheral Pulses, No clubbing, cyanosis. Trace edema  LYMPH: No lymphadenopathy noted  SKIN: No rashes or lesions      Care Discussed with Consultants/Other Providers [ x] YES  [ ] NO  Patient is a 82y old  Male who presents with a chief complaint of acute decompesated heart failure (26 Dec 2022 14:46)      INTERVAL HPI/OVERNIGHT EVENTS:   Patient was in VT storm overnight and this AM. He was shocked multiple times by AICD overnight. Started on lidocaine gtt. Intubated and sedated this AM.     ICU Vital Signs Last 24 Hrs  T(C): 37.2 (27 Dec 2022 04:00), Max: 37.9 (26 Dec 2022 12:00)  T(F): 99 (27 Dec 2022 04:00), Max: 100.2 (26 Dec 2022 12:00)  HR: 73 (27 Dec 2022 07:30) (73 - 140)  BP: 146/60 (26 Dec 2022 08:11) (146/60 - 146/60)  BP(mean): 86 (26 Dec 2022 08:11) (86 - 86)  ABP: 111/59 (27 Dec 2022 07:00) (70/41 - 111/59)  ABP(mean): 76 (27 Dec 2022 07:00) (55 - 77)  RR: 22 (27 Dec 2022 07:00) (18 - 32)  SpO2: 100% (27 Dec 2022 07:30) (93% - 100%)    O2 Parameters below as of 26 Dec 2022 20:00  Patient On (Oxygen Delivery Method): nasal cannula  O2 Flow (L/min): 3        I&O's Summary    26 Dec 2022 07:01  -  27 Dec 2022 07:00  --------------------------------------------------------  IN: 1929.4 mL / OUT: 2950 mL / NET: -1020.6 mL      Mode: AC/ CMV (Assist Control/ Continuous Mandatory Ventilation)  RR (machine): 14  TV (machine): 450  FiO2: 100  PEEP: 8  ITime: 0.8  MAP: 12  PIP: 25      LABS:                        8.1    11.87 )-----------( 243      ( 27 Dec 2022 04:29 )             27.6     12-27    131<L>  |  95<L>  |  55<H>  ----------------------------<  222<H>  3.5   |  25  |  2.2<H>    Ca    8.0<L>      27 Dec 2022 04:29  Mg     2.4         TPro  5.4<L>  /  Alb  3.1<L>  /  TBili  0.4  /  DBili  x   /  AST  36  /  ALT  16  /  AlkPhos  30      PT/INR - ( 25 Dec 2022 17:25 )   PT: 17.80 sec;   INR: 1.54 ratio         PTT - ( 27 Dec 2022 04:23 )  PTT:62.1 sec  Urinalysis Basic - ( 26 Dec 2022 03:00 )    Color: Light Yellow / Appearance: Clear / S.012 / pH: x  Gluc: x / Ketone: Negative  / Bili: Negative / Urobili: <2 mg/dL   Blood: x / Protein: Negative / Nitrite: Negative   Leuk Esterase: Negative / RBC: x / WBC x   Sq Epi: x / Non Sq Epi: x / Bacteria: x      CAPILLARY BLOOD GLUCOSE      POCT Blood Glucose.: 293 mg/dL (27 Dec 2022 07:36)  POCT Blood Glucose.: 244 mg/dL (27 Dec 2022 05:59)    ABG - ( 27 Dec 2022 04:46 )  pH, Arterial: 7.51  pH, Blood: x     /  pCO2: 32    /  pO2: 130   / HCO3: 26    / Base Excess: 2.6   /  SaO2: 98.0                RADIOLOGY & ADDITIONAL TESTS:    Consultant(s) Notes Reviewed:  [x ] YES  [ ] NO    MEDICATIONS  (STANDING):  aMIOdarone Infusion 1 mG/Min (33.3 mL/Hr) IV Continuous <Continuous>  cefepime   IVPB      cefepime   IVPB 2000 milliGRAM(s) IV Intermittent every 12 hours  chlorhexidine 2% Cloths 1 Application(s) Topical <User Schedule>  fentaNYL    Injectable 100 MICROGram(s) IV Push once  fentaNYL   Infusion... 0.5 MICROgram(s)/kG/Hr (2.84 mL/Hr) IV Continuous <Continuous>  heparin  Infusion.  Unit(s)/Hr (23 mL/Hr) IV Continuous <Continuous>  lidocaine   Infusion 2 mG/Min (15 mL/Hr) IV Continuous <Continuous>  LORazepam   Injectable 2 milliGRAM(s) IV Push once  metoprolol tartrate 25 milliGRAM(s) Oral two times a day  midazolam Infusion 0.02 mG/kG/Hr (2.27 mL/Hr) IV Continuous <Continuous>  norepinephrine Infusion 0.05 MICROgram(s)/kG/Min (10.6 mL/Hr) IV Continuous <Continuous>  pantoprazole    Tablet 40 milliGRAM(s) Oral before breakfast  potassium chloride  20 mEq/100 mL IVPB 20 milliEquivalent(s) IV Intermittent every 2 hours  procainamide   IVPB 1000 milliGRAM(s) IV Intermittent once  tamsulosin 0.4 milliGRAM(s) Oral at bedtime  vancomycin  IVPB 1000 milliGRAM(s) IV Intermittent every 24 hours    MEDICATIONS  (PRN):  heparin   Injectable 84023 Unit(s) IV Push every 6 hours PRN For aPTT less than 40  heparin   Injectable 5000 Unit(s) IV Push every 6 hours PRN For aPTT between 40 - 57      PHYSICAL EXAM:  GENERAL: ill-appearing male, in no acute distress. Right IJ Speer Dony in place.   HEAD:  Atraumatic, Normocephalic  EYES: EOMI, PERRLA, conjunctiva and sclera clear  NECK: Supple, No JVD, Normal thyroid, no enlarged nodes  NERVOUS SYSTEM:  Alert & Awake.   CHEST/LUNG: B/L good air entry; No rales, rhonchi, or wheezing  HEART: S1S2 normal, no S3, Regular rate and rhythm; High pitched, crescendo-decrescendo mid systolic ejection murmur present  ABDOMEN: Soft, Nontender, Nondistended; Bowel sounds present  EXTREMITIES:  2+ Peripheral Pulses, No clubbing, cyanosis. Trace edema  LYMPH: No lymphadenopathy noted  SKIN: No rashes or lesions      Care Discussed with Consultants/Other Providers [ x] YES  [ ] NO

## 2022-12-27 NOTE — PROGRESS NOTE ADULT - ASSESSMENT
81yo M hx of CAD (prev hx of MI), HFrEF s/p AICD, severe AS, Afib/Aflutter on eliquis, CKD (baseline ~ 2.0), HLD, gout presenting to the ED for AICD discharge x 5. Patient has been experiencing shortness of breath with exertion and chest comfort for several weeks. Today, patient was going to bathroom when he started feeling lightheaded, and short of breath. Patient felt he was becoming disoriented, anxious, and had AICD shock him 5 times, prompting family to call ED. Patient had Colville Dony catheter placement on 12/26.    #Cardiogenic shock  #Acute decompensated heart failure  #Acute hypoxemic respiratory failure 2/2 pulmonary edema  #NSTEMI  #CKD  #Lactic acidosis  #AICD shock 2/2 ventricualar tachycardia  #CAD  #HLD  #BPH  #Leukocytosis  #Elbow erythema vs cellulitis?      PLAN:    CNS: avoid sedation    HEENT:  Oral care    PULMONARY:  HOB @ 45 degrees, Patient off BiPAP 12/26 AM, now on 3-4L NC. keep SaO2 92-96%, f/u AM Chest Xray    CARDIOVASCULAR:    - Patient follows with cardiologist Dr. Dwyer at [facility].   - Colville-dony placed, currently volume overloaded.   - Was on bumex gtt, discontinued 12/26.  Avoid over-diuresing given AS.  - TTE 12/26 showed EF 25-30%, severe aortic stenosis.  - A-line placed. maintain MAP 65-70, Discontinued levophed 12/26. Held dobutamine due to run of v tach overnight.   - cont amio gtt as per protocol. digoxin added to control HR 2/2 atrial fibrillation with RVR (450 x1, followed by 250 q8H x 2). Stopped digoxin due to kidney function. Avoid BBs, CCBs, ACEi  - trend troponin. check AM EKG  - cont telemetry monitoring  - EP following, recommended adding mexilitine 150 mg q8hrs due to persistent A. fib. F/u recommendations. Possible Cath tomorrow, NPO after midnight.   - AICD interrogation needed.     GI: cont PPI, dash/tlc diet with fluid restriction    RENAL:  F/u  lytes.  maintain K>4.0, Mg>2.0,. accurate I/O, bueno inserted to monitor urine output    INFECTIOUS DISEASE: s/p cefepime and vancomycin. monitor leukocytosis. check MRSA. f/u Blood Cx, UCx, UA, procal. Stopped cefazolin. Started course of vancomycin and cefepime for broad spectrum coverage.   - lactate improved    HEMATOLOGICAL:  start heparin gtt. monitor PTT 60-90. had a previous admission at outside hospital for suspected GIB from NSAID use    ENDOCRINE:  Follow up FS.  Insulin protocol if needed. Hold allopurinol for now    MUSCULOSKELETAL: bedrest.     CODE STATUS: FULL CODE. Discussed with patient about GOC. Patient wants full code    DISPOSITION: Pt requires continued monitoring in the CCU    NOTE: EMERGENCY CONTACT IS THE DAUGHTER- TATYANA JACKSON- NOT THE WIFE.  - CAN CHECK PREVIOUS RECORDS FROM OUTSIDE HOSPITAL ON HIS LABTOP IF NEEDED   83yo M hx of CAD (prev hx of MI), HFrEF s/p AICD, severe AS, Afib/Aflutter on eliquis, CKD (baseline ~ 2.0), HLD, gout presenting to the ED for AICD discharge x 5. Patient has been experiencing shortness of breath with exertion and chest comfort for several weeks. Today, patient was going to bathroom when he started feeling lightheaded, and short of breath. Patient felt he was becoming disoriented, anxious, and had AICD shock him 5 times, prompting family to call ED. Patient had John Day Dony catheter placement on 12/26.    #Cardiogenic shock  #Acute decompensated heart failure  #Acute hypoxemic respiratory failure 2/2 pulmonary edema  #NSTEMI  #CKD  #Lactic acidosis  #AICD shock 2/2 ventricualar tachycardia  #CAD  #HLD  #BPH  #Leukocytosis  #Elbow erythema vs cellulitis?      PLAN:    CNS: Sedated with Fentanyl and versed gtt. Requires sedation for VT storm.     HEENT:  Oral care    PULMONARY:  HOB @ 45 degrees, Patient off BiPAP 12/26 AM. Patient intubated and sedated 12/27 for V tach storm.     CARDIOVASCULAR:    - Patient follows with cardiologist Dr. Dwyer at [place].   - John Day-dony placed, currently euvolemic.  - Was on bumex gtt, discontinued 12/26.  Avoid over-diuresing given AS. Started on IV bumex 2mg q8h.  - TTE 12/26 showed EF 25-30%, severe aortic stenosis. Global cardiomyopathy.   - A-line placed. maintain MAP 65-70, Discontinued levophed 12/26. Held dobutamine due to runs of v tach.   - Had multiple runs of v tach, shocked multiple times. Patient in VT storm, required intubation and sedation. Converted to normal sinus rhythm after intubation. Continue with amiodarone gtt, added lidocaine gtt. Patient received IV push of lidocaine and IV push of amiodarone during VT storm this AM.   - cont telemetry monitoring  - EP following, recommended adding mexilitine 150 mg q8hrs due to persistent A. fib. Discontinued due to addition of lidocaine gtt. F/u recommendations. Possible cath tomorrow. NPO after midnight.   - Patient will require Balloon angiovalvuloplasty due to severe aortic stenosis. Requires heart cath.  - f/u CT heart structural 12/27 prior to possible BAV.   - Hypotensive requiring vasopressin and birdie. Wean as tolerated.     GI: cont PPI. OGT placed. Will start feeds after heart cath.     RENAL:  F/u  lytes.  maintain K>4.0, Mg>2.0,. accurate I/O, bueno inserted to monitor urine output    INFECTIOUS DISEASE: s/p cefepime and vancomycin. monitor leukocytosis. check MRSA. f/u Blood Cx, UCx, UA, procal. Stopped cefazolin. Started course of vancomycin and cefepime for broad spectrum coverage.   - lactate improved    HEMATOLOGICAL:  start heparin gtt. monitor PTT 60-90. had a previous admission at outside hospital for suspected GIB from NSAID use    ENDOCRINE:  Follow up FS q6hrs.  Insulin protocol if needed. Hold allopurinol for now    MUSCULOSKELETAL: bedrest.     CODE STATUS: FULL CODE. Discussed with patient about GOC. Patient wants full code    DISPOSITION: Pt requires continued monitoring in the CCU    NOTE: EMERGENCY CONTACT IS THE DAUGHTER- TATYANA JACKSON- NOT THE WIFE.  - CAN CHECK PREVIOUS RECORDS FROM OUTSIDE HOSPITAL ON HIS LABTOP IF NEEDED

## 2022-12-27 NOTE — CONSULT NOTE ADULT - ASSESSMENT
MIN JACKSON is a 82y man with a medical history significant for severe aortic stenosis, CKD, and HFrEF s/p AICD who presented initially with outpatient shocks from his AICD, and is now in the critical care unit for VT storm, now intubated on 12/27 as therapeutic for VT.     Impression    Acute Respiratory Failure  VT Storm  EVE on CKD  Sepsis, unclear source  Mild respiratory acidosis    Plan:    Patient is deeply sedated per cardiology for treatment of VT  RR 18, no change   = 6cc/kg IBW  pO2 > 200, recommend decreasing FiO2 to 60%  PEEP 8, continue with this  Plateau pressure 19, driving pressure 11.  Maintain driving pressure <15, Plateau < 30. MIN JACKSON is a 82y man with a medical history significant for severe aortic stenosis, CKD, and HFrEF s/p AICD who presented initially with outpatient shocks from his AICD, and is now in the critical care unit for VT storm, now intubated on 12/27 as therapeutic for VT.     Impression    Acute Respiratory Failure  Acute decompensated heart failure, Cardiogenic shock  Acute Pulmonary Edema  VT Storm  EVE on CKD  Sepsis, unclear source  Mild respiratory acidosis    Plan:    Patient is deeply sedated per cardiology for treatment of VT  diuresis to euvolemia per primary team  RR 18, no change   = 6cc/kg IBW  pO2 > 200, recommend decreasing FiO2 to 60%  PEEP 8, continue with this  Plateau pressure 19, driving pressure 11.  Maintain driving pressure <15, Plateau < 30.

## 2022-12-27 NOTE — CHART NOTE - NSCHARTNOTEFT_GEN_A_CORE
12/26 10pm    lactate 0.90  pH: 7.48   CO  4.1  CI   1.7  MAP  65  CVP  10  SVR  1073    0.59  Buddy  2.3    If CVP trends upward, give 1 bumex push. (spot diuretics throughout night)  Start Brendon, titrate MAP to 70 12/26 10pm    lactate 4.5  pH: 7.17  CO  3.9  CI   1.6  MAP  76  CVP  17  SVR  1210    0.65  Buddy  1.64    - f/u CXR, cbc, cmp, lactate, ptt, inr, t&s for 11:30pm  - Give 2 amps of bicarb, Start Bumex drip, wean off levophed, start dobutamine 12/26 10pm    lactate 4.5  pH: 7.17  CO  3.9  CI   1.6  MAP  76  CVP  17  SVR  1210    0.65  Buddy  1.64    - f/u CXR, cbc, cmp, lactate, ptt, inr, t&s for 11:30pm  - Give 2 amps of bicarb, Start Bumex drip, wean off levophed, start dobutamine    11pm  - patient became hypoxic SpO2 50s-low 80s. ABG obtained. lactated elevated. pH improved to 7.23. pO2 69. SaO2 92.3 @ FiO2 100%. CVP elevated @ 18. 2mg Bumex IVP given. 1 amp bicarb given. D50+insulin given for hyperkalemia. Given persistent hypoxia, PEEP increased to 10 and RR increased to 16 for vent settings. 12/26 10pm    lactate 4.5  pH: 7.17  CO  3.9  CI   1.6  MAP  76  CVP  17  SVR  1210    0.65  Buddy  1.64    - f/u CXR, cbc, cmp, lactate, ptt, inr, t&s for 11:30pm  - Give 2 amps of bicarb, Start Bumex drip, wean off levophed, start dobutamine    11pm  - patient became hypoxic SpO2 50s-low 80s. ABG obtained. lactated elevated. pH improved to 7.23. pO2 69. SaO2 92.3 @ FiO2 100%. CVP elevated @ 18. 2mg Bumex IVP given. 1 amp bicarb given. D50+insulin given for hyperkalemia. Given persistent hypoxia, PEEP increased to 10 and RR increased to 16 for vent settings.    Plan discussed with cardio fellow

## 2022-12-27 NOTE — CONSULT NOTE ADULT - CRITICAL CARE ATTENDING COMMENT
This patient is critically ill due to the following:  * Hemodynamic instability requiring titration of vasopressors or other vasoactive agents  * Respiratory instability requiring management of invasive ventilation  * Multiple organ failure requiring complex decision-making, and there is a high probability of imminent or life-threatening deterioration in the patient’s condition  * The patient required frequent reassessments and monitoring to ensure response to interventions and therapies.    Critical care time includes time spent evaluating and treating the patient's acute illness as well as time spent reviewing labs, radiology,  and discussing the case with a multidisciplinary team in an effort to prevent further life threatening deterioration or end organ damage. This time is independent of any procedures performed.

## 2022-12-28 NOTE — CHART NOTE - NSCHARTNOTEFT_GEN_A_CORE
PRE-OP DIAGNOSIS:  VT/Severe AS      PROCEDURE:     [x] Coronary Angiogram     [x] LHC     [] LVG     [] RHC     [] Intervention (see below)         PHYSICIAN:  Dr. Patricia Merrill     Fellow:      PROCEDURE DESCRIPTION:     Consent:      [x] Patient     [] Family Member     []  Used        Anesthesia:     [] General     [x] Sedation     [x] Local        Access & Closure:     [] Fr Radial Artery     [x]12- Fr Femoral Artery ==> Perclose    [] Fr Femoral Vein     [] Fr Brachial Vein       IV Contrast: 40mL        Intervention: Balloon Aortic valvuloplasty      Implants: None        FINDINGS:     Coronary Dominance: Right       LM: Mild atherosclerotic disease    LAD: Mild atherosclerotic disease  D1: Mild atherosclerotic disease    CX: Mild atherosclerotic disease  OM1: Mild atherosclerotic disease        RCA: 100% mid stenosis      LVEDP: 24 mmHg     EF: 20%       Mean gradient across AV:  Pre-intervention:  24mmHg  Post intervention: 12.2 mmHg     ESTIMATED BLOOD LOSS: < 10 mL        CONDITION:     [x] Good     [] Fair     [] Critical        SPECIMEN REMOVED: N/A       POST-OP DIAGNOSIS:      [] Normal Coronary Angiogram     [] Mild Coronary Artery Disease (< 50% stenosis)     [x] 1-Vessel Coronary Artery Disease (RCA)  [x] Severe AS s/p Ballon Aortic valvuloplasty       PLAN OF CARE:     [] D/C Home Today     [x] Return to In-patient bed     [] Admit for observation     [] Return for Staged Procedure     [] CT Surgery Consult     [] Medications:     [x] IV Fluids: 100cc/hr x 4 hours PRE-OP DIAGNOSIS:  VT/Severe AS      PROCEDURE:     [x] Coronary Angiogram     [x] LHC     [] LVG     [] RHC     [] Intervention (see below)         PHYSICIAN:  Dr. Hayes, Dr. Gomez     Fellow:      PROCEDURE DESCRIPTION:     Consent:      [x] Patient     [] Family Member     []  Used        Anesthesia:     [] General     [x] Sedation     [x] Local        Access & Closure:     [] Fr Radial Artery     [x]12- Fr Femoral Artery ==> Perclose    [] Fr Femoral Vein     [] Fr Brachial Vein       IV Contrast: 40mL        Intervention: Balloon Aortic valvuloplasty      Implants: None        FINDINGS:     Coronary Dominance: Right       LM: Mild atherosclerotic disease    LAD: Mild atherosclerotic disease  D1: Mild atherosclerotic disease    CX: Mild atherosclerotic disease  OM1: Mild atherosclerotic disease        RCA: 100% mid stenosis      LVEDP: 24 mmHg     EF: 20%       Mean gradient across AV:  Pre-intervention:  24mmHg  Post intervention: 12.2 mmHg     ESTIMATED BLOOD LOSS: < 10 mL        CONDITION:     [x] Good     [] Fair     [] Critical        SPECIMEN REMOVED: N/A       POST-OP DIAGNOSIS:      [] Normal Coronary Angiogram     [] Mild Coronary Artery Disease (< 50% stenosis)     [x] 1-Vessel Coronary Artery Disease (RCA)  [x] Severe AS s/p Ballon Aortic valvuloplasty       PLAN OF CARE:     [] D/C Home Today     [x] Return to In-patient bed     [] Admit for observation     [] Return for Staged Procedure     [] CT Surgery Consult     [] Medications:     [x] IV Fluids: 100cc/hr x 4 hours    SEE CATH REPORT FOR ATTENDING IMPRESSION

## 2022-12-28 NOTE — DISCHARGE NOTE FOR THE EXPIRED PATIENT - HOSPITAL COURSE
83yo M hx of CAD (prev hx of MI), HFrEF s/p AICD, severe AS, Afib/Aflutter on eliquis, CKD (baseline ~ 2.0), HLD, gout presenting to the ED for AICD discharge x 5. Patient has been experiencing shortness of breath with exertion and chest comfort for several weeks. Today, patient was going to bathroom when he started feeling lightheaded, and short of breath. Patient felt he was becoming disoriented, anxious, and had AICD shock him 5 times, prompting family to call ED. Otherwise, patient denies other complaints including fever, chest pain, palpitations, wheezing, abdominal pain, nausea/vomiting, melena, dysuria. Patient does endorse joint pain, joint swelling- in the right elbow. He initially went to outside hospital for evaluation of his right elbow because "it swelled up like a balloon" that was observed by his PT therapist. During their workup, no infection was found. Patient did have a low hemoglobin prompting a prbc t ransfusion and an EGD/colonoscopy (etiology presumed to be from NSAID use due to neck pain). Hb improved and patient was discharged, but shortness of breath nor chest discomfort was addressed at that time.     At the ED, T- 97.8F, BP: 108/67, HR: 121bpm, RR: 20bpm, SaO2: 97% on RA. Patient was feeling more dyspneic, and O2 saturation decreased to high 80s-> prompting requiring bipap support. Labs significant for elevated WBC- 22.3K, Hb- 9.6, creatinine- 2.3,  troponin- 0.15, BNP- 25K. On VBG- lactate- 3.7. Chest Xray shows cardiomegaly and prominent interstitial infiltrates. IVC is dilated on bedside US. EKG shows sinus tachycardia with PVCs and ST abnormalities. AICD interoogation showed ventricular tachycardia during his shocks. Patient is admitted to the CCU for cardiogenic shock from acute decompensated HF. Patient was started on loaded and started on amiodarone gtt. 1x 60mg IV lasix was given and transitioned to bumex gtt. 1x cefepime and vancomycin were given, blood cultures x2 collected. With bipap, dyspnea improved. Levophed started to maintain MAP>65. heparin gtt initially started for anticoagulation, but will be held for procedure to do invasive monitoring.     Throughout hospitalization, patient went into ventricular tachycardia many times with his AICD shocking him back into sinus rhythm. After managing patient's rhythm and maintaining CO with pressors and antiarrhythmics patient ultimately got intubated to treat persistent ventricular tachycardia storm. On 12/27 patient under valvuloplasty for severe aortic stenosis. After the procedure patient was noted to be bradycardic and so he was paced to a HR of 75. Patient's lactic acidosis worsened despite bicarb drip, repeat CXR showed worsening fluid retention. Pt went into pulseless V-Tach at 12:40 on 12/28, they were manually shocked with defibrillator and proceeded to administer epi, bicarb, lidocaine, amiodarone and further shocks per ACLS protocol.   83yo M hx of CAD (prev hx of MI), HFrEF s/p AICD, severe AS, Afib/Aflutter on eliquis, CKD (baseline ~ 2.0), HLD, gout presenting to the ED for AICD discharge x 5. Patient has been experiencing shortness of breath with exertion and chest comfort for several weeks. Today, patient was going to bathroom when he started feeling lightheaded, and short of breath. Patient felt he was becoming disoriented, anxious, and had AICD shock him 5 times, prompting family to call ED. Otherwise, patient denies other complaints including fever, chest pain, palpitations, wheezing, abdominal pain, nausea/vomiting, melena, dysuria. Patient does endorse joint pain, joint swelling- in the right elbow. He initially went to outside hospital for evaluation of his right elbow because "it swelled up like a balloon" that was observed by his PT therapist. During their workup, no infection was found. Patient did have a low hemoglobin prompting a prbc t ransfusion and an EGD/colonoscopy (etiology presumed to be from NSAID use due to neck pain). Hb improved and patient was discharged, but shortness of breath nor chest discomfort was addressed at that time.     At the ED, T- 97.8F, BP: 108/67, HR: 121bpm, RR: 20bpm, SaO2: 97% on RA. Patient was feeling more dyspneic, and O2 saturation decreased to high 80s-> prompting requiring bipap support. Labs significant for elevated WBC- 22.3K, Hb- 9.6, creatinine- 2.3,  troponin- 0.15, BNP- 25K. On VBG- lactate- 3.7. Chest Xray shows cardiomegaly and prominent interstitial infiltrates. IVC is dilated on bedside US. EKG shows sinus tachycardia with PVCs and ST abnormalities. AICD interoogation showed ventricular tachycardia during his shocks. Patient is admitted to the CCU for cardiogenic shock from acute decompensated HF. Patient was started on loaded and started on amiodarone gtt. 1x 60mg IV lasix was given and transitioned to bumex gtt. 1x cefepime and vancomycin were given, blood cultures x2 collected. With bipap, dyspnea improved. Levophed started to maintain MAP>65. heparin gtt initially started for anticoagulation, but will be held for procedure to do invasive monitoring.     Throughout hospitalization, patient went into ventricular tachycardia many times with his AICD shocking him back into sinus rhythm. After managing patient's rhythm and maintaining CO with pressors and antiarrhythmics patient ultimately got intubated to treat persistent ventricular tachycardia storm. On 12/27 patient underwent valvuloplasty for severe aortic stenosis. After the procedure patient was noted to be bradycardic and so he was paced to a HR of 75. Patient's lactic acidosis worsened despite bicarb drip, repeat CXR showed worsening fluid retention. Pt went into pulseless V-Tach at 12:40 on 12/28, they were manually shocked with defibrillator and the team proceeded to administer epi, bicarb, lidocaine, amiodarone and further shocks per ACLS protocol.   83yo M hx of CAD (prev hx of MI), HFrEF s/p AICD, severe AS, Afib/Aflutter on eliquis, CKD (baseline ~ 2.0), HLD, gout presenting to the ED for AICD discharge x 5. Patient has been experiencing shortness of breath with exertion and chest comfort for several weeks. Today, patient was going to bathroom when he started feeling lightheaded, and short of breath. Patient felt he was becoming disoriented, anxious, and had AICD shock him 5 times, prompting family to call ED. Otherwise, patient denies other complaints including fever, chest pain, palpitations, wheezing, abdominal pain, nausea/vomiting, melena, dysuria. Patient does endorse joint pain, joint swelling- in the right elbow. He initially went to outside hospital for evaluation of his right elbow because "it swelled up like a balloon" that was observed by his PT therapist. During their workup, no infection was found. Patient did have a low hemoglobin prompting a prbc t ransfusion and an EGD/colonoscopy (etiology presumed to be from NSAID use due to neck pain). Hb improved and patient was discharged, but shortness of breath nor chest discomfort was addressed at that time.     At the ED, T- 97.8F, BP: 108/67, HR: 121bpm, RR: 20bpm, SaO2: 97% on RA. Patient was feeling more dyspneic, and O2 saturation decreased to high 80s-> prompting requiring bipap support. Labs significant for elevated WBC- 22.3K, Hb- 9.6, creatinine- 2.3,  troponin- 0.15, BNP- 25K. On VBG- lactate- 3.7. Chest Xray shows cardiomegaly and prominent interstitial infiltrates. IVC is dilated on bedside US. EKG shows sinus tachycardia with PVCs and ST abnormalities. AICD interoogation showed ventricular tachycardia during his shocks. Patient is admitted to the CCU for cardiogenic shock from acute decompensated HF. Patient was started on loaded and started on amiodarone gtt. 1x 60mg IV lasix was given and transitioned to bumex gtt. 1x cefepime and vancomycin were given, blood cultures x2 collected. With bipap, dyspnea improved. Levophed started to maintain MAP>65. heparin gtt initially started for anticoagulation, but will be held for procedure to do invasive monitoring.     Throughout hospitalization, patient went into ventricular tachycardia many times with his AICD shocking him back into sinus rhythm. After managing patient's rhythm and maintaining CO with pressors and antiarrhythmics patient ultimately got intubated to treat persistent ventricular tachycardia storm. On 12/27 patient underwent valvuloplasty for severe aortic stenosis. After the procedure patient was noted to be bradycardic and so he was paced to a HR of 75. Patient's lactic acidosis worsened despite bicarb drip, repeat CXR showed worsening fluid retention. Pt went into pulseless V-Tach at 12:40 on 12/28, they were manually shocked with defibrillator and the team proceeded to administer epi, bicarb, lidocaine, amiodarone and further shocks per ACLS protocol.    ATTENDING ATTESTATION:  s/p BAV without complication.  Good gradient reduction.  Pacemaker rate increased to 75 for ectopy suppression.  Hemodynamics stable on pressor support (weaning).  Recurrent refractory VT with unsuccessful CPR / shocks.

## 2022-12-28 NOTE — RAPID RESPONSE TEAM SUMMARY - NSSITUATIONBACKGROUNDRRT_GEN_ALL_CORE
Throughout hospitalization, patient went into ventricular tachycardia many times with his AICD shocking him back into sinus rhythm. After managing patient's rhythm and maintaining CO with pressors and antiarrhythmics patient ultimately got intubated to treat persistent ventricular tachycardia storm. On 12/27 patient underwent valvuloplasty for severe aortic stenosis. After the procedure patient was noted to be bradycardic and so he was paced to a HR of 75. Patient's lactic acidosis worsened despite bicarb drip, repeat CXR showed worsening fluid retention. Pt went into pulseless V-Tach at 12:40 on 12/28, they were manually shocked with defibrillator and afterwards the team proceeded to administer epi, bicarb, lidocaine, amiodarone and further shocks per ACLS protocol.

## 2022-12-31 LAB
CULTURE RESULTS: SIGNIFICANT CHANGE UP
CULTURE RESULTS: SIGNIFICANT CHANGE UP
SPECIMEN SOURCE: SIGNIFICANT CHANGE UP
SPECIMEN SOURCE: SIGNIFICANT CHANGE UP